# Patient Record
Sex: MALE | Race: WHITE | NOT HISPANIC OR LATINO
[De-identification: names, ages, dates, MRNs, and addresses within clinical notes are randomized per-mention and may not be internally consistent; named-entity substitution may affect disease eponyms.]

---

## 2018-08-09 PROBLEM — Z00.00 ENCOUNTER FOR PREVENTIVE HEALTH EXAMINATION: Status: ACTIVE | Noted: 2018-08-09

## 2018-08-14 ENCOUNTER — APPOINTMENT (OUTPATIENT)
Dept: INTERNAL MEDICINE | Facility: CLINIC | Age: 68
End: 2018-08-14

## 2018-08-14 VITALS
HEART RATE: 53 BPM | DIASTOLIC BLOOD PRESSURE: 70 MMHG | WEIGHT: 206 LBS | TEMPERATURE: 97.7 F | SYSTOLIC BLOOD PRESSURE: 160 MMHG | HEIGHT: 68 IN | OXYGEN SATURATION: 94 % | BODY MASS INDEX: 31.22 KG/M2

## 2018-08-14 DIAGNOSIS — Z78.9 OTHER SPECIFIED HEALTH STATUS: ICD-10-CM

## 2018-08-14 DIAGNOSIS — Z82.5 FAMILY HISTORY OF ASTHMA AND OTHER CHRONIC LOWER RESPIRATORY DISEASES: ICD-10-CM

## 2018-08-14 NOTE — ASSESSMENT
[FreeTextEntry1] : pt with a 1.3 cm nodule seen oncxr. This does not correlate with pulm nodules seen on ct scan of 2016. pt will need repeat ct of chest to better define pulm nodule.

## 2018-08-14 NOTE — HISTORY OF PRESENT ILLNESS
[FreeTextEntry1] : abnormal cxr. [de-identified] : 66 yo nonsmoker. pt has been followed with subcentimeter pulm nodules. the largest is 5mm. this was documented on ct scan in 2016. pt recently went for routine physical and sent for cxr.  this reveals 1.3 cm danya nodular density abutting 9 th posterior rib. this does not correlate with any of pulm nodules seen on prior ct scans. Pt feels fine without sob, cough or wheezing. no weight loss or loss of appetite.

## 2018-08-16 ENCOUNTER — MEDICATION RENEWAL (OUTPATIENT)
Age: 68
End: 2018-08-16

## 2019-03-13 ENCOUNTER — RECORD ABSTRACTING (OUTPATIENT)
Age: 69
End: 2019-03-13

## 2019-03-13 DIAGNOSIS — Z80.0 FAMILY HISTORY OF MALIGNANT NEOPLASM OF DIGESTIVE ORGANS: ICD-10-CM

## 2019-03-13 DIAGNOSIS — Z83.3 FAMILY HISTORY OF DIABETES MELLITUS: ICD-10-CM

## 2019-03-13 DIAGNOSIS — Z87.19 PERSONAL HISTORY OF OTHER DISEASES OF THE DIGESTIVE SYSTEM: ICD-10-CM

## 2019-03-13 DIAGNOSIS — Z82.49 FAMILY HISTORY OF ISCHEMIC HEART DISEASE AND OTHER DISEASES OF THE CIRCULATORY SYSTEM: ICD-10-CM

## 2019-03-13 DIAGNOSIS — Z84.89 FAMILY HISTORY OF OTHER SPECIFIED CONDITIONS: ICD-10-CM

## 2019-03-13 RX ORDER — ASPIRIN 81 MG
81 TABLET, DELAYED RELEASE (ENTERIC COATED) ORAL
Refills: 0 | Status: ACTIVE | COMMUNITY

## 2019-04-29 ENCOUNTER — APPOINTMENT (OUTPATIENT)
Dept: CARDIOLOGY | Facility: CLINIC | Age: 69
End: 2019-04-29
Payer: COMMERCIAL

## 2019-04-29 VITALS
HEART RATE: 57 BPM | WEIGHT: 210 LBS | HEIGHT: 68 IN | BODY MASS INDEX: 31.83 KG/M2 | DIASTOLIC BLOOD PRESSURE: 70 MMHG | SYSTOLIC BLOOD PRESSURE: 140 MMHG

## 2019-04-29 PROCEDURE — 93000 ELECTROCARDIOGRAM COMPLETE: CPT

## 2019-04-29 PROCEDURE — 99213 OFFICE O/P EST LOW 20 MIN: CPT

## 2019-04-29 RX ORDER — ESOMEPRAZOLE SODIUM 40 MG/5ML
40 INJECTION, POWDER, LYOPHILIZED, FOR SOLUTION INTRAVENOUS
Refills: 0 | Status: DISCONTINUED | COMMUNITY
End: 2019-04-29

## 2019-04-29 RX ORDER — ATORVASTATIN CALCIUM 80 MG/1
TABLET, FILM COATED ORAL
Refills: 0 | Status: DISCONTINUED | COMMUNITY
End: 2019-04-29

## 2019-04-29 RX ORDER — OMEPRAZOLE 40 MG/1
40 CAPSULE, DELAYED RELEASE ORAL
Qty: 30 | Refills: 0 | Status: DISCONTINUED | COMMUNITY
Start: 2018-03-18 | End: 2019-04-29

## 2019-04-29 RX ORDER — LISINOPRIL AND HYDROCHLOROTHIAZIDE TABLETS 10; 12.5 MG/1; MG/1
10-12.5 TABLET ORAL
Refills: 0 | Status: DISCONTINUED | COMMUNITY
End: 2019-04-29

## 2019-04-29 NOTE — DISCUSSION/SUMMARY
[FreeTextEntry1] : The patient underwent treadmill stress testing just now. This revealed no evidence of exercise-induced myocardial ischemia. (He was originally scheduled for stress echo but this was denied by his insurance company). I believe it is very important for the patient to increase his program of exercise and obtain weight reduction. I advised a diet limited and carbohydrates.

## 2019-04-29 NOTE — PHYSICAL EXAM
[General Appearance - Well Developed] : well developed [Normal Appearance] : normal appearance [Well Groomed] : well groomed [General Appearance - Well Nourished] : well nourished [No Deformities] : no deformities [General Appearance - In No Acute Distress] : no acute distress [Normal Conjunctiva] : the conjunctiva exhibited no abnormalities [Eyelids - No Xanthelasma] : the eyelids demonstrated no xanthelasmas [Normal Oral Mucosa] : normal oral mucosa [No Oral Pallor] : no oral pallor [No Oral Cyanosis] : no oral cyanosis [Normal Jugular Venous A Waves Present] : normal jugular venous A waves present [Normal Jugular Venous V Waves Present] : normal jugular venous V waves present [No Jugular Venous Neil A Waves] : no jugular venous neil A waves [Respiration, Rhythm And Depth] : normal respiratory rhythm and effort [Exaggerated Use Of Accessory Muscles For Inspiration] : no accessory muscle use [Auscultation Breath Sounds / Voice Sounds] : lungs were clear to auscultation bilaterally [Heart Rate And Rhythm] : heart rate and rhythm were normal [Heart Sounds] : normal S1 and S2 [Murmurs] : no murmurs present [Abdomen Soft] : soft [Abdomen Tenderness] : non-tender [Abdomen Mass (___ Cm)] : no abdominal mass palpated [Abnormal Walk] : normal gait [Gait - Sufficient For Exercise Testing] : the gait was sufficient for exercise testing [Nail Clubbing] : no clubbing of the fingernails [Cyanosis, Localized] : no localized cyanosis [Petechial Hemorrhages (___cm)] : no petechial hemorrhages [Skin Color & Pigmentation] : normal skin color and pigmentation [] : no rash [No Venous Stasis] : no venous stasis [Skin Lesions] : no skin lesions [No Skin Ulcers] : no skin ulcer [No Xanthoma] : no  xanthoma was observed [Oriented To Time, Place, And Person] : oriented to person, place, and time [Affect] : the affect was normal [Mood] : the mood was normal [No Anxiety] : not feeling anxious

## 2019-04-29 NOTE — REASON FOR VISIT
[FreeTextEntry1] : The patient comes to the office today for cardiology followup and stress testing. He has a number of coronary risk factors particularly hyperlipidemia sedentary lifestyle and overweight.The patient does describe an atypical discomfort in the left pectoral region. This typically occurs at night and not during exertion.

## 2019-06-04 ENCOUNTER — RX RENEWAL (OUTPATIENT)
Age: 69
End: 2019-06-04

## 2019-07-29 DIAGNOSIS — R91.1 SOLITARY PULMONARY NODULE: ICD-10-CM

## 2019-07-30 ENCOUNTER — APPOINTMENT (OUTPATIENT)
Dept: CARDIOLOGY | Facility: CLINIC | Age: 69
End: 2019-07-30
Payer: COMMERCIAL

## 2019-07-30 VITALS
DIASTOLIC BLOOD PRESSURE: 60 MMHG | WEIGHT: 210 LBS | BODY MASS INDEX: 31.83 KG/M2 | HEART RATE: 55 BPM | SYSTOLIC BLOOD PRESSURE: 118 MMHG | HEIGHT: 68 IN

## 2019-07-30 PROBLEM — R91.1 PULMONARY NODULE: Status: ACTIVE | Noted: 2018-08-14

## 2019-07-30 PROCEDURE — 99214 OFFICE O/P EST MOD 30 MIN: CPT

## 2019-07-30 PROCEDURE — 93000 ELECTROCARDIOGRAM COMPLETE: CPT

## 2019-07-30 NOTE — HISTORY OF PRESENT ILLNESS
[FreeTextEntry1] : Mr Rincon has been followed here since 2011 for chest pain, cath showed nonocclusive disease.Since last visit he has not been hospitalized. He is working and doesn't find the time to exercise. he gets about 5000 steps/day at work. he describes a left pectoral discomfort at night at rest, close to his clavicle which is burning and sharp and is relieved by "moving around". He had one episode of dyspnea on exertion with an incline. He denies palpitations or syncope.

## 2019-08-05 ENCOUNTER — RX RENEWAL (OUTPATIENT)
Age: 69
End: 2019-08-05

## 2020-01-21 ENCOUNTER — APPOINTMENT (OUTPATIENT)
Dept: CARDIOLOGY | Facility: CLINIC | Age: 70
End: 2020-01-21
Payer: COMMERCIAL

## 2020-01-21 VITALS
HEART RATE: 54 BPM | SYSTOLIC BLOOD PRESSURE: 112 MMHG | DIASTOLIC BLOOD PRESSURE: 60 MMHG | WEIGHT: 214 LBS | BODY MASS INDEX: 32.43 KG/M2 | HEIGHT: 68 IN

## 2020-01-21 PROCEDURE — 93015 CV STRESS TEST SUPVJ I&R: CPT

## 2020-01-21 PROCEDURE — 99214 OFFICE O/P EST MOD 30 MIN: CPT | Mod: 25

## 2020-01-21 RX ORDER — NITROGLYCERIN 0.4 MG/1
0.4 TABLET SUBLINGUAL
Qty: 30 | Refills: 3 | Status: ACTIVE | COMMUNITY
Start: 2020-01-21 | End: 1900-01-01

## 2020-01-21 RX ORDER — FAMOTIDINE 40 MG/1
40 TABLET, FILM COATED ORAL DAILY
Refills: 0 | Status: ACTIVE | COMMUNITY

## 2020-01-21 RX ORDER — RANITIDINE 300 MG/1
300 TABLET ORAL
Qty: 30 | Refills: 0 | Status: DISCONTINUED | COMMUNITY
Start: 2018-03-30 | End: 2020-01-21

## 2020-01-21 NOTE — HISTORY OF PRESENT ILLNESS
[FreeTextEntry1] : Mr Rincon has been followed here since 2011 for chest pain, cath showed nonocclusive disease\par .Since last visit he has not been hospitalized. \par He is working and doesn't find the time to exercise. he gets about 5000 steps/day at work.\par he continues to have upper left pectoral pain, dull at rest, lasting an hour relieved  spontaneously, daily He is feeling it is more lately which he attributes to stress. He denies dyspnea, palpitations or syncope.\par

## 2020-01-21 NOTE — REVIEW OF SYSTEMS
[Eyeglasses] : currently wearing eyeglasses [Negative] : Heme/Lymph [FreeTextEntry1] : polyuria, cough, insomnia

## 2020-05-31 ENCOUNTER — RX RENEWAL (OUTPATIENT)
Age: 70
End: 2020-05-31

## 2020-06-29 ENCOUNTER — RESULT CHARGE (OUTPATIENT)
Age: 70
End: 2020-06-29

## 2020-06-30 ENCOUNTER — APPOINTMENT (OUTPATIENT)
Dept: CARDIOLOGY | Facility: CLINIC | Age: 70
End: 2020-06-30
Payer: COMMERCIAL

## 2020-06-30 VITALS
WEIGHT: 212 LBS | DIASTOLIC BLOOD PRESSURE: 80 MMHG | HEIGHT: 68 IN | SYSTOLIC BLOOD PRESSURE: 140 MMHG | BODY MASS INDEX: 32.13 KG/M2 | HEART RATE: 58 BPM

## 2020-06-30 PROCEDURE — 99214 OFFICE O/P EST MOD 30 MIN: CPT

## 2020-06-30 PROCEDURE — 93000 ELECTROCARDIOGRAM COMPLETE: CPT

## 2020-06-30 RX ORDER — METFORMIN HYDROCHLORIDE 1000 MG/1
1000 TABLET, COATED ORAL
Refills: 0 | Status: ACTIVE | COMMUNITY

## 2020-06-30 NOTE — REASON FOR VISIT
[Coronary Artery Disease] : coronary artery disease [FreeTextEntry2] : 3 month [Follow-Up - Clinic] : a clinic follow-up of

## 2020-06-30 NOTE — HISTORY OF PRESENT ILLNESS
[FreeTextEntry1] : Mr Rincon has been followed here since 2011 for chest pain, cath showed nonocclusive disease\par .Since last visit he has not been hospitalized. \par he continues to have the upper left pectoral discomfort at rest, sporadic, nonexertional. He denies chest pain,  palpitations or syncope.\par He is sedentary.\par \par

## 2020-09-07 ENCOUNTER — NON-APPOINTMENT (OUTPATIENT)
Age: 70
End: 2020-09-07

## 2020-10-12 ENCOUNTER — APPOINTMENT (OUTPATIENT)
Dept: GASTROENTEROLOGY | Facility: CLINIC | Age: 70
End: 2020-10-12
Payer: COMMERCIAL

## 2020-10-12 VITALS
HEART RATE: 54 BPM | WEIGHT: 210 LBS | SYSTOLIC BLOOD PRESSURE: 114 MMHG | HEIGHT: 68 IN | BODY MASS INDEX: 31.83 KG/M2 | DIASTOLIC BLOOD PRESSURE: 60 MMHG | TEMPERATURE: 97.1 F

## 2020-10-12 DIAGNOSIS — Z12.11 ENCOUNTER FOR SCREENING FOR MALIGNANT NEOPLASM OF COLON: ICD-10-CM

## 2020-10-12 DIAGNOSIS — Z80.0 ENCOUNTER FOR SCREENING FOR MALIGNANT NEOPLASM OF COLON: ICD-10-CM

## 2020-10-12 PROCEDURE — 99205 OFFICE O/P NEW HI 60 MIN: CPT

## 2020-10-12 NOTE — ASSESSMENT
[FreeTextEntry1] : 1. History of colon polyp / family h/o colon cancer / BRBPR: Colonoscopy planned for screening / follow up and to document hemorrhoids as source of occasional BRBPR\par \par 2. Abdominal pain:  CT scan if colonoscopy unremarkable\par \par 3. GERD:  Continue dietary  / lifestyle modification along with PPI

## 2020-10-12 NOTE — REASON FOR VISIT
[Consultation] : a consultation visit [FreeTextEntry1] : Family h/o colon cancer / colon cancer screening

## 2020-10-12 NOTE — CONSULT LETTER
[Dear  ___] : Dear  [unfilled], [Consult Letter:] : I had the pleasure of evaluating your patient, [unfilled]. [Please see my note below.] : Please see my note below. [Consult Closing:] : Thank you very much for allowing me to participate in the care of this patient.  If you have any questions, please do not hesitate to contact me. [Sincerely,] : Sincerely, [FreeTextEntry3] : Eddie Fuentes MD\par tel: [phone]\par fax: [phone]\par

## 2020-10-12 NOTE — HISTORY OF PRESENT ILLNESS
[de-identified] : ANDREA GROVE  is being evaluated at the request of Dr. Latham for an opinion re:  colon cancer screening. Additionally c/o a 6 month h/o of mild i / intermittent RLQ pain ( no  clear precipitating  / alleviating factors ) . Noticeable mostly while lying down at nignt. Denies nausea, vomiting, fever, chills, diarrhea, constipation, melena, hematemesis. Admits to occasional BRBPR which he attributes to hemorrhoids.  His reflux is controlled with diet and medication. \par Colonoscopy in 9/2015 notable for diverticulosis , hemorrhoids, 1 adenoma, 1 sessile serrated polyp and 1 hyperplastic polyp. EGD 11/2015 for GERD notable  for fundal polyps, H. pylori associated gastritis ( treated..with documented eradication) \par Colonoscopy in 2011 and 2004 ( family h/o colon cancer in brother)..polyps / diverticulosis / hemorrhoids.  Colonoscopy in 2006 was unremarkable\par \par

## 2020-10-30 ENCOUNTER — RESULT REVIEW (OUTPATIENT)
Age: 70
End: 2020-10-30

## 2020-11-01 ENCOUNTER — RESULT REVIEW (OUTPATIENT)
Age: 70
End: 2020-11-01

## 2020-11-02 ENCOUNTER — APPOINTMENT (OUTPATIENT)
Dept: GASTROENTEROLOGY | Facility: HOSPITAL | Age: 70
End: 2020-11-02

## 2020-11-04 ENCOUNTER — TRANSCRIPTION ENCOUNTER (OUTPATIENT)
Age: 70
End: 2020-11-04

## 2020-11-05 ENCOUNTER — NON-APPOINTMENT (OUTPATIENT)
Age: 70
End: 2020-11-05

## 2020-12-23 PROBLEM — Z12.11 ENCOUNTER FOR COLONOSCOPY IN PATIENT WITH FAMILY HISTORY OF COLON CANCER: Status: RESOLVED | Noted: 2020-10-12 | Resolved: 2020-12-23

## 2021-02-04 ENCOUNTER — APPOINTMENT (OUTPATIENT)
Dept: CARDIOLOGY | Facility: CLINIC | Age: 71
End: 2021-02-04
Payer: COMMERCIAL

## 2021-02-04 VITALS
BODY MASS INDEX: 31.07 KG/M2 | HEIGHT: 68 IN | TEMPERATURE: 97.7 F | DIASTOLIC BLOOD PRESSURE: 65 MMHG | WEIGHT: 205 LBS | HEART RATE: 54 BPM | SYSTOLIC BLOOD PRESSURE: 110 MMHG

## 2021-02-04 DIAGNOSIS — K64.9 UNSPECIFIED HEMORRHOIDS: ICD-10-CM

## 2021-02-04 PROCEDURE — 99072 ADDL SUPL MATRL&STAF TM PHE: CPT

## 2021-02-04 PROCEDURE — 99214 OFFICE O/P EST MOD 30 MIN: CPT

## 2021-02-04 PROCEDURE — 93000 ELECTROCARDIOGRAM COMPLETE: CPT

## 2021-02-04 NOTE — HISTORY OF PRESENT ILLNESS
[FreeTextEntry1] : Mr Rincon has been followed here since 2011 for chest pain, cath showed nonocclusive disease\par .Since last visit he has not been hospitalized. \par During a dental cleaning a few weeks ago he felt mild left pectoral pain, dull, lasting until the cleaning was done, \par he continues to have the upper left pectoral discomfort at rest, at night sporadic, nonexertional positional. He denies chest pain,  palpitations or syncope.\par He is sedentary.\par He recently shoveled without difficulty.\par \par

## 2021-03-08 ENCOUNTER — RESULT REVIEW (OUTPATIENT)
Age: 71
End: 2021-03-08

## 2021-04-14 ENCOUNTER — RX RENEWAL (OUTPATIENT)
Age: 71
End: 2021-04-14

## 2021-04-29 ENCOUNTER — APPOINTMENT (OUTPATIENT)
Dept: NEUROLOGY | Facility: CLINIC | Age: 71
End: 2021-04-29
Payer: COMMERCIAL

## 2021-04-29 ENCOUNTER — LABORATORY RESULT (OUTPATIENT)
Age: 71
End: 2021-04-29

## 2021-04-29 VITALS
SYSTOLIC BLOOD PRESSURE: 132 MMHG | HEIGHT: 68 IN | BODY MASS INDEX: 30.62 KG/M2 | HEART RATE: 53 BPM | DIASTOLIC BLOOD PRESSURE: 77 MMHG | WEIGHT: 202 LBS | TEMPERATURE: 97.2 F

## 2021-04-29 PROCEDURE — 99072 ADDL SUPL MATRL&STAF TM PHE: CPT

## 2021-04-29 PROCEDURE — 99204 OFFICE O/P NEW MOD 45 MIN: CPT

## 2021-04-29 RX ORDER — LOSARTAN POTASSIUM 50 MG/1
50 TABLET, FILM COATED ORAL DAILY
Refills: 0 | Status: DISCONTINUED | COMMUNITY
Start: 2019-05-13 | End: 2021-04-29

## 2021-04-29 NOTE — PHYSICAL EXAM
[FreeTextEntry1] : Physical examination \par General: No acute distress, Awake, Alert.   \par  \par \par Mental status \par Awake, alert, gives detailed history.\par \par Cranial Nerves \par II: VFF  \par III, IV, VI: PERRL, EOMI.   \par V: Facial sensation is normal B/L.   \par VII: Facial strength is normal B/L. \par \par \par VIII: Gross hearing is intact.   \par  \par \par IX, X: Palate is midline and elevates symmetrically.   \par XI: Trapezius normal strength.   \par XII: Tongue midline without atrophy or fasciculations. \par \par Motor exam  \par Muscle tone - no evidence of rigidity or resistance in all 4 extremities.  \par No atrophy or fasciculations \par Muscle Strength: arms and legs, proximal and distal flexors and extensors are normal \par \par No UE drift.\par \par Reflexes \par All present, normal, and symmetrical.   \par   \par Plantars right: mute.   \par Plantars left: mute.   \par \par Absent ankle jerks.   \par \par Coordination \par Finger to nose: Normal.  \par Heel to shin: Normal.    \par \par Sensory \par Intact proprioception. \par Decreased PP to hands and feet in stocking glove distribution.\par Decreased cold feet. \par Decreased vibration to great toes B.\par  \par \par Gait \par Normal including heels, toes, and tandem gait.  \par

## 2021-04-29 NOTE — HISTORY OF PRESENT ILLNESS
[FreeTextEntry1] : Pacheco Rincon is a 70 year old man with a history of CAD, type 2 DM x 3 years, and sleep apnea presenting for a consultation for pain in his feet and toes - worst at night. \par \par He endorses he has had burning pain in both feet for over one year primarily in the sole and toes. His pain fluctuates in intensity and his feet also feel cold at times.  Mr. Rincon denies any weakness. He saw a podiatrist and was told he had diabetic neuropathy. He is currently taking Gabapentin 300mg at night with partial relief. The pain used to wake him up more frequently at night but still disturbs his sleep. He endorses an upset stomach for 1-2 weeks and is not sure if it is medication related. He had increased the medication one week prior to his stomach discomfort and takes Omeprazole and Famotidine. He denies any balance changes, bowel or bladder difficulties, falls, or injuries. \par \par He had arterial dopplers done and was told that it was not consistent with peripheral artery disease. \par \par The remaining neurological review of systems is negative. \par

## 2021-04-29 NOTE — ASSESSMENT
Please call patient's wife, Varsha, to clarify dosage of escitalopram (LEXAPRO) 20 MG tablet    She says that yesterdays AVS shows a different dosage than what she thinks it is supposed to be   [FreeTextEntry1] : Pacheco Rincon is a 70 year old man with peripheral neuropathy.\par \par Clinically consistent with a painful small fiber peripheral neuropathy most likely due to diabetes mellitus\par Serological workup to look for any other identifiable causes.  \par EMG/NCS to assess for any large fiber involvement. \par Titrate Gabapentin upward to 600mg nightly (see chart note)\par Follow up in 6 weeks and then one week after EMG.\par \par I discussed in detail with the patient the diagnosis, prognosis, treatment plan and answered all of his questions.\par \par \par

## 2021-04-29 NOTE — CONSULT LETTER
[Dear  ___] : Dear  [unfilled], [FreeTextEntry1] : I had the pleasure of evaluating your patient, ANDREA GROVE. Please see the assessment section below for a summary of my diagnostic impression and plan.\par \par Thank you very much for allowing me to participate in the care of this patient. If you have any questions, please do not hesitate to contact me. \par \par Sincerely,\par \par Janel Eduardo MD\par Milana Pina N.P.\par

## 2021-04-29 NOTE — REASON FOR VISIT
[Consultation] : a consultation visit [FreeTextEntry1] : Pain in the feet and toes especially at night

## 2021-05-17 LAB
B BURGDOR IGG+IGM SER QL IB: NORMAL
TSH SERPL-ACNC: 0.42 UIU/ML
VIT B12 SERPL-MCNC: 699 PG/ML

## 2021-05-19 LAB
ALBUMIN MFR SERPL ELPH: 63.6 %
ALBUMIN SERPL-MCNC: 4.3 G/DL
ALBUMIN/GLOB SERPL: 1.8 RATIO
ALPHA1 GLOB MFR SERPL ELPH: 3.8 %
ALPHA1 GLOB SERPL ELPH-MCNC: 0.3 G/DL
ALPHA2 GLOB MFR SERPL ELPH: 9.7 %
ALPHA2 GLOB SERPL ELPH-MCNC: 0.6 G/DL
B-GLOBULIN MFR SERPL ELPH: 11.2 %
B-GLOBULIN SERPL ELPH-MCNC: 0.8 G/DL
GAMMA GLOB FLD ELPH-MCNC: 0.8 G/DL
GAMMA GLOB MFR SERPL ELPH: 11.7 %
INTERPRETATION SERPL IEP-IMP: NORMAL
M PROTEIN SPEC IFE-MCNC: NORMAL
PROT SERPL-MCNC: 6.7 G/DL
PROT SERPL-MCNC: 6.7 G/DL

## 2021-05-20 LAB
ALBUPE: 14.6 %
ALPHA1UPE: 40.4 %
ALPHA2UPE: 20.8 %
BETAUPE: 16.3 %
CREAT 24H UR-MCNC: NORMAL G/24 H
CREATININE UR (MAYO): 110 MG/DL
GAMMAUPE: 7.9 %
IGA 24H UR QL IFE: NORMAL
KAPPA LC 24H UR QL: NORMAL
PROT PATTERN 24H UR ELPH-IMP: NORMAL
PROT UR-MCNC: 9 MG/DL
PROT UR-MCNC: 9 MG/DL
SPECIMEN VOL 24H UR: NORMAL ML

## 2021-05-21 LAB — VIT B6 SERPL-MCNC: 74.9 UG/L

## 2021-05-24 LAB — VIT B1 SERPL-MCNC: 164.6 NMOL/L

## 2021-06-03 ENCOUNTER — RX RENEWAL (OUTPATIENT)
Age: 71
End: 2021-06-03

## 2021-06-10 ENCOUNTER — APPOINTMENT (OUTPATIENT)
Dept: NEUROLOGY | Facility: CLINIC | Age: 71
End: 2021-06-10
Payer: COMMERCIAL

## 2021-06-10 VITALS
WEIGHT: 201 LBS | BODY MASS INDEX: 30.46 KG/M2 | TEMPERATURE: 97.5 F | HEART RATE: 51 BPM | SYSTOLIC BLOOD PRESSURE: 118 MMHG | DIASTOLIC BLOOD PRESSURE: 70 MMHG | HEIGHT: 68 IN

## 2021-06-10 PROCEDURE — 99072 ADDL SUPL MATRL&STAF TM PHE: CPT

## 2021-06-10 PROCEDURE — 99214 OFFICE O/P EST MOD 30 MIN: CPT

## 2021-06-13 NOTE — ASSESSMENT
[FreeTextEntry1] : Pacheco Rincon is a 70 year old man with peripheral neuropathy.\par \par Clinically consistent with a painful small fiber peripheral neuropathy most likely due to diabetes mellitus\par Serological workup to look for any other identifiable causes.  \par EMG/NCS to assess for any large fiber involvement scheduled for 7/7. \par Avoid B6 supplements- pt does not currently take any.\par Titrate Gabapentin upward to 700mg nightly (see chart note) and monitor for side effects and improvement.\par Follow up one week after EMG.\par \par I discussed in detail with the patient the diagnosis, prognosis, treatment plan and answered all of his questions.\par \par \par

## 2021-06-13 NOTE — HISTORY OF PRESENT ILLNESS
[FreeTextEntry1] : Pacheco Rincon is a 70 year old man with a history of CAD, T2DM, and sleep apnea presenting for a follow up appointment for pain in his feet and toes-worse at night.\par \par He endorses he is currently on Gabapentin 500mg nightly. He is unsure if it makes him tired or not. He still has burning pain in the morning. He reports sensitivity with the sheets on his feet at night and would like to continue to Gabapentin. Denies any weakness.\par \par The remaining neurological review of systems is negative. \par \par 4/29/21\par Pacheco Rincon is a 70 year old man with a history of CAD, type 2 DM x 3 years, and sleep apnea presenting for a consultation for pain in his feet and toes - worst at night. \par \par He endorses he has had burning pain in both feet for over one year primarily in the sole and toes. His pain fluctuates in intensity and his feet also feel cold at times.  Mr. Rincon denies any weakness. He saw a podiatrist and was told he had diabetic neuropathy. He is currently taking Gabapentin 300mg at night with partial relief. The pain used to wake him up more frequently at night but still disturbs his sleep. He endorses an upset stomach for 1-2 weeks and is not sure if it is medication related. He had increased the medication one week prior to his stomach discomfort and takes Omeprazole and Famotidine. He denies any balance changes, bowel or bladder difficulties, falls, or injuries. \par \par He had arterial dopplers done and was told that it was not consistent with peripheral artery disease. \par \par The remaining neurological review of systems is negative. \par

## 2021-06-13 NOTE — END OF VISIT
[Time Spent: ___ minutes] : I have spent [unfilled] minutes of time on the encounter. [FreeTextEntry3] :   I agree with the findings and plan as documented in the Nurse Practitioner’s note, unless noted below.\par Attesting Faculty: See Attending Electronic Signature Below\par \par

## 2021-07-07 ENCOUNTER — APPOINTMENT (OUTPATIENT)
Dept: NEUROLOGY | Facility: CLINIC | Age: 71
End: 2021-07-07
Payer: COMMERCIAL

## 2021-07-07 PROCEDURE — 99072 ADDL SUPL MATRL&STAF TM PHE: CPT

## 2021-07-07 PROCEDURE — 95911 NRV CNDJ TEST 9-10 STUDIES: CPT

## 2021-07-07 PROCEDURE — 95886 MUSC TEST DONE W/N TEST COMP: CPT

## 2021-07-15 ENCOUNTER — APPOINTMENT (OUTPATIENT)
Dept: NEUROLOGY | Facility: CLINIC | Age: 71
End: 2021-07-15
Payer: COMMERCIAL

## 2021-07-15 VITALS
HEART RATE: 52 BPM | TEMPERATURE: 96.9 F | HEIGHT: 68 IN | SYSTOLIC BLOOD PRESSURE: 112 MMHG | WEIGHT: 202 LBS | BODY MASS INDEX: 30.62 KG/M2 | DIASTOLIC BLOOD PRESSURE: 73 MMHG

## 2021-07-15 PROCEDURE — 99214 OFFICE O/P EST MOD 30 MIN: CPT

## 2021-07-15 PROCEDURE — 99072 ADDL SUPL MATRL&STAF TM PHE: CPT

## 2021-07-16 RX ORDER — OMEPRAZOLE 40 MG/1
40 CAPSULE, DELAYED RELEASE ORAL
Refills: 0 | Status: DISCONTINUED | COMMUNITY
End: 2021-07-16

## 2021-07-16 RX ORDER — OMEPRAZOLE 20 MG/1
20 CAPSULE, DELAYED RELEASE ORAL
Qty: 90 | Refills: 0 | Status: ACTIVE | COMMUNITY
Start: 2021-07-12

## 2021-07-21 NOTE — HISTORY OF PRESENT ILLNESS
[FreeTextEntry1] : Pacheco Rincon is a 70 year old man with a history of CAD, T2DM, and sleep apnea presenting for a follow up appointment for pain in his feet and toes and EMG results.\par \par He endorses he is currently taking Gabapentin 600mg nightly one hour before bedtime. He did not want to increase the dose. The medication provides relief of the pain at night. He continues to have minimal amount of pain during the day, he did not add the Gabapentin 100mg dose yet during the daytime or morning. He denies any weakness.\par \par The remaining neurological review of systems is negative. \par \par 6/10/21\par Pacheco Rincon is a 70 year old man with a history of CAD, T2DM, and sleep apnea presenting for a follow up appointment for pain in his feet and toes-worse at night.\par \par He endorses he is currently on Gabapentin 500mg nightly. He is unsure if it makes him tired or not. He still has burning pain in the morning. He reports sensitivity with the sheets on his feet at night and would like to continue to Gabapentin. Denies any weakness.\par \par The remaining neurological review of systems is negative. \par \par 4/29/21\par Pacheco Rincon is a 70 year old man with a history of CAD, type 2 DM x 3 years, and sleep apnea presenting for a consultation for pain in his feet and toes - worst at night. \par \par He endorses he has had burning pain in both feet for over one year primarily in the sole and toes. His pain fluctuates in intensity and his feet also feel cold at times.  Mr. Rincon denies any weakness. He saw a podiatrist and was told he had diabetic neuropathy. He is currently taking Gabapentin 300mg at night with partial relief. The pain used to wake him up more frequently at night but still disturbs his sleep. He endorses an upset stomach for 1-2 weeks and is not sure if it is medication related. He had increased the medication one week prior to his stomach discomfort and takes Omeprazole and Famotidine. He denies any balance changes, bowel or bladder difficulties, falls, or injuries. \par \par He had arterial dopplers done and was told that it was not consistent with peripheral artery disease. \par \par The remaining neurological review of systems is negative. \par

## 2021-07-21 NOTE — ASSESSMENT
[FreeTextEntry1] : Pacheco Rincon is a 70 year old man with peripheral neuropathy.\par Clinically consistent with a painful small fiber peripheral neuropathy most likely due to diabetes mellitus\par   \par EMG/NCS reviewed with patient- No large fiber involvement.\par Avoid B6 supplements- pt does not currently take any.\par Continue Gabapentin 600mg nightly. Pt given script for additional dose of Gabapentin 100mg in AM if he wants to add AM dose for daytime pain which is mild now. He will consider it. \par \par Follow up in 3 months. \par \par Case discussed with Dr. Eduardo. \par \par I discussed in detail with the patient the diagnosis, prognosis, treatment plan and answered all of his questions.\par \par \par

## 2021-07-21 NOTE — PHYSICAL EXAM
[FreeTextEntry1] : Physical examination \par General: No acute distress, Awake, Alert.   \par  \par \par Mental status \par Awake, alert, gives detailed history.\par \par Cranial Nerves \par II: VFF  \par III, IV, VI: PERRL, EOMI.   \par V: Facial sensation is normal B/L.   \par VII: Facial strength is normal B/L. \par \par \par VIII: Gross hearing is intact.   \par  \par \par IX, X: Palate is midline and elevates symmetrically.   \par XI: Trapezius normal strength.   \par XII: Tongue midline without atrophy or fasciculations. \par \par Motor exam  \par Muscle tone - no evidence of rigidity or resistance in all 4 extremities.  \par No atrophy or fasciculations \par Muscle Strength: arms and legs, proximal and distal flexors and extensors are normal \par \par No UE drift.\par \par Reflexes \par All present, normal, and symmetrical.   \par   \par Plantars right: mute.   \par Plantars left: mute.   \par \par Absent ankle jerks.   \par \par Coordination \par Finger to nose: Normal.  \par Heel to shin: Normal.    \par \par Sensory \par Intact proprioception. \par Decreased PP to hands and feet in stocking glove distribution.\par Decreased cold feet. \par Decreased vibration to great toes B.\par  \par mild positive Romberg.\par \par Gait \par Normal including heels, toes, and tandem gait.  \par

## 2021-07-21 NOTE — DATA REVIEWED
[de-identified] : 7/7/21 EMG legs- There is no electrophysiologic evidence of a LARGE FIBER polyneuropathy involving the legs. There is no electrophysiologic evidence of a lumbosacral radiculopathy involving the motor axons, bilaterally. The H reflex is absent, bilaterally. This may be normal for age or may be due to a chronic S1 radiculopathy, bilaterally. \par 3/16/21 TSH .184\par 3/15/21 HgA1C 6.6

## 2021-08-05 ENCOUNTER — APPOINTMENT (OUTPATIENT)
Dept: CARDIOLOGY | Facility: CLINIC | Age: 71
End: 2021-08-05

## 2021-09-30 ENCOUNTER — APPOINTMENT (OUTPATIENT)
Dept: CARDIOLOGY | Facility: CLINIC | Age: 71
End: 2021-09-30
Payer: COMMERCIAL

## 2021-09-30 VITALS
HEIGHT: 68 IN | SYSTOLIC BLOOD PRESSURE: 120 MMHG | TEMPERATURE: 68.14 F | DIASTOLIC BLOOD PRESSURE: 72 MMHG | WEIGHT: 202 LBS | BODY MASS INDEX: 30.62 KG/M2

## 2021-09-30 PROCEDURE — 90471 IMMUNIZATION ADMIN: CPT

## 2021-09-30 PROCEDURE — 93000 ELECTROCARDIOGRAM COMPLETE: CPT

## 2021-09-30 PROCEDURE — 99214 OFFICE O/P EST MOD 30 MIN: CPT | Mod: 25

## 2021-09-30 PROCEDURE — 90662 IIV NO PRSV INCREASED AG IM: CPT

## 2021-09-30 NOTE — HISTORY OF PRESENT ILLNESS
[FreeTextEntry1] : Mr Rincon has been followed here since 2011 for chest pain, cath showed nonocclusive disease\par .Since last visit he has not been hospitalized. \par \par He has occasional chest discomfort, during physical work a few days ago, left sided, tightness, lasted minutes, relieved by rest. He denies , dyspnea, palpitations or syncope.\par \par He is sedentary.\par \par \par

## 2021-10-14 ENCOUNTER — NON-APPOINTMENT (OUTPATIENT)
Age: 71
End: 2021-10-14

## 2021-10-14 ENCOUNTER — APPOINTMENT (OUTPATIENT)
Dept: NEUROLOGY | Facility: CLINIC | Age: 71
End: 2021-10-14
Payer: COMMERCIAL

## 2021-10-14 VITALS
BODY MASS INDEX: 30.62 KG/M2 | WEIGHT: 202 LBS | HEIGHT: 68 IN | TEMPERATURE: 97.2 F | HEART RATE: 47 BPM | SYSTOLIC BLOOD PRESSURE: 126 MMHG | DIASTOLIC BLOOD PRESSURE: 74 MMHG

## 2021-10-14 VITALS — HEART RATE: 50 BPM

## 2021-10-14 PROCEDURE — 99214 OFFICE O/P EST MOD 30 MIN: CPT

## 2021-10-15 NOTE — DATA REVIEWED
[de-identified] : 7/7/21 EMG legs- There is no electrophysiologic evidence of a LARGE FIBER polyneuropathy involving the legs. There is no electrophysiologic evidence of a lumbosacral radiculopathy involving the motor axons, bilaterally. The H reflex is absent, bilaterally. This may be normal for age or may be due to a chronic S1 radiculopathy, bilaterally. \par 3/16/21 TSH .184\par 3/15/21 HgA1C 6.6

## 2021-10-15 NOTE — HISTORY OF PRESENT ILLNESS
[FreeTextEntry1] : Pacheco Rincon is a 70 year old man with a history of CAD, T2DM, and sleep apnea presenting for a follow up appointment for neuropathic pain.\par \par He is currently taking Gabapentin 600mg at 8pm and goes to bed at 10:30pm. Mr. Rincon reports the burning pain in his feet starts in the evening at 8pm. He endorses his pain is acceptable during the day except at times when he wants to walk during lunchtime. He has difficulty sleeping and burning pain during the night. He did not take the additional dose of Gabapentin 100mg due to fear of drowsiness for when he goes to work. Denies weakness or progression of symptoms.\par \par Mr. Rincon endorses he had a general lightheadedness for a few minutes last week and plans on following up with his cardiologist because of the adjustment in his cardiac medications. Denies falls or balance difficulties. Denies the sensation of the room spinning, facial droop, changes in speech, or weakness on one side. Denies any dizziness currently. \par \par He has a CPAP machine for obstructive sleep apnea and wakes up with a morning frontal headache which improves during the day. He has not seen his sleep specialist in a long time.\par \par The remaining neurological review of systems is negative.\par \par 7/15/21 \par Pacheco Rincon is a 70 year old man with a history of CAD, T2DM, and sleep apnea presenting for a follow up appointment for pain in his feet and toes and EMG results.\par \par He endorses he is currently taking Gabapentin 600mg nightly one hour before bedtime. He did not want to increase the dose. The medication provides relief of the pain at night. He continues to have minimal amount of pain during the day, he did not add the Gabapentin 100mg dose yet during the daytime or morning. He denies any weakness.\par \par The remaining neurological review of systems is negative. \par \par 6/10/21\par Pacheco Rincon is a 70 year old man with a history of CAD, T2DM, and sleep apnea presenting for a follow up appointment for pain in his feet and toes-worse at night.\par \par He endorses he is currently on Gabapentin 500mg nightly. He is unsure if it makes him tired or not. He still has burning pain in the morning. He reports sensitivity with the sheets on his feet at night and would like to continue to Gabapentin. Denies any weakness.\par \par The remaining neurological review of systems is negative. \par \par 4/29/21\par Pacheco Rincon is a 70 year old man with a history of CAD, type 2 DM x 3 years, and sleep apnea presenting for a consultation for pain in his feet and toes - worst at night. \par \par He endorses he has had burning pain in both feet for over one year primarily in the sole and toes. His pain fluctuates in intensity and his feet also feel cold at times.  Mr. Rincon denies any weakness. He saw a podiatrist and was told he had diabetic neuropathy. He is currently taking Gabapentin 300mg at night with partial relief. The pain used to wake him up more frequently at night but still disturbs his sleep. He endorses an upset stomach for 1-2 weeks and is not sure if it is medication related. He had increased the medication one week prior to his stomach discomfort and takes Omeprazole and Famotidine. He denies any balance changes, bowel or bladder difficulties, falls, or injuries. \par \par He had arterial dopplers done and was told that it was not consistent with peripheral artery disease. \par \par The remaining neurological review of systems is negative. \par

## 2021-10-15 NOTE — ASSESSMENT
[FreeTextEntry1] : Pacheco Rincon is a 71 year old man with peripheral neuropathy.\par Clinically consistent with a painful small fiber peripheral neuropathy most likely due to diabetes mellitus\par   \par EMG/NCS reviewed with patient previously- No large fiber involvement.\par Avoid B6 supplements- pt does not currently take any.\par Continue Gabapentin 600mg nightly will increase by 100mg weekly up to 900mg in the evening (see chart note). Pt encouraged to begin trial of increasing dose when he does not have to go to work to monitor for any sedation.  \par \par Obstructive sleep apnea with recent morning headaches- follow up with sleep specialist.\par \par Asymptomatic bradycardia and episode of dizziness- continue follow up with cardiology. \par \par Follow up in 3 months. \par \par Case discussed with Dr. Eduardo. \par \par I discussed in detail with the patient the diagnosis, prognosis, treatment plan and answered all of his questions.\par \par \par

## 2022-01-12 ENCOUNTER — APPOINTMENT (OUTPATIENT)
Dept: NEUROLOGY | Facility: CLINIC | Age: 72
End: 2022-01-12
Payer: COMMERCIAL

## 2022-01-12 PROCEDURE — 99212 OFFICE O/P EST SF 10 MIN: CPT | Mod: 95

## 2022-01-12 RX ORDER — PSYLLIUM HUSK 0.4 G
CAPSULE ORAL
Refills: 0 | Status: ACTIVE | COMMUNITY

## 2022-01-12 RX ORDER — CHLORHEXIDINE GLUCONATE 4 %
LIQUID (ML) TOPICAL
Refills: 0 | Status: ACTIVE | COMMUNITY

## 2022-01-12 RX ORDER — ASCORBIC ACID 500 MG
TABLET ORAL
Refills: 0 | Status: ACTIVE | COMMUNITY

## 2022-01-12 NOTE — HISTORY OF PRESENT ILLNESS
[FreeTextEntry1] : Pacheco Rincon is a 71 year old man with a history of CAD, T2DM, and sleep apnea presenting for a follow up appointment for neuropathic pain.\par \par He is currently taking Gabapentin 800mg at 8pm and it lasts partially throughout the night but he will have burning at times. He has not tried Lidocaine cream in the past. He did not try any higher dose of Gabapentin. He denies weakness or balance difficulties or worsening dizziness. Mr. Rincon is awaiting his CPAP machine for ROSA. \par Does not want to increase Gabapentin dose much further due to possible drowsiness side effect. \par \par The remaining neurological review of systems is negative.\par \par 10/14/21\par Pacheco Rincon is a 70 year old man with a history of CAD, T2DM, and sleep apnea presenting for a follow up appointment for neuropathic pain.\par \par He is currently taking Gabapentin 600mg at 8pm and goes to bed at 10:30pm. Mr. Rincon reports the burning pain in his feet starts in the evening at 8pm. He endorses his pain is acceptable during the day except at times when he wants to walk during lunchtime. He has difficulty sleeping and burning pain during the night. He did not take the additional dose of Gabapentin 100mg due to fear of drowsiness for when he goes to work. Denies weakness or progression of symptoms.\par \par Mr. Rincon endorses he had a general lightheadedness for a few minutes last week and plans on following up with his cardiologist because of the adjustment in his cardiac medications. Denies falls or balance difficulties. Denies the sensation of the room spinning, facial droop, changes in speech, or weakness on one side. Denies any dizziness currently. \par \par He has a CPAP machine for obstructive sleep apnea and wakes up with a morning frontal headache which improves during the day. He has not seen his sleep specialist in a long time.\par \par The remaining neurological review of systems is negative.\par \par 7/15/21 \par Pacheco Rincon is a 70 year old man with a history of CAD, T2DM, and sleep apnea presenting for a follow up appointment for pain in his feet and toes and EMG results.\par \par He endorses he is currently taking Gabapentin 600mg nightly one hour before bedtime. He did not want to increase the dose. The medication provides relief of the pain at night. He continues to have minimal amount of pain during the day, he did not add the Gabapentin 100mg dose yet during the daytime or morning. He denies any weakness.\par \par The remaining neurological review of systems is negative. \par \par 6/10/21\par Pacheco Rincon is a 70 year old man with a history of CAD, T2DM, and sleep apnea presenting for a follow up appointment for pain in his feet and toes-worse at night.\par \par He endorses he is currently on Gabapentin 500mg nightly. He is unsure if it makes him tired or not. He still has burning pain in the morning. He reports sensitivity with the sheets on his feet at night and would like to continue to Gabapentin. Denies any weakness.\par \par The remaining neurological review of systems is negative. \par \par 4/29/21\par Pacheco Rincon is a 70 year old man with a history of CAD, type 2 DM x 3 years, and sleep apnea presenting for a consultation for pain in his feet and toes - worst at night. \par \par He endorses he has had burning pain in both feet for over one year primarily in the sole and toes. His pain fluctuates in intensity and his feet also feel cold at times.  Mr. Rincon denies any weakness. He saw a podiatrist and was told he had diabetic neuropathy. He is currently taking Gabapentin 300mg at night with partial relief. The pain used to wake him up more frequently at night but still disturbs his sleep. He endorses an upset stomach for 1-2 weeks and is not sure if it is medication related. He had increased the medication one week prior to his stomach discomfort and takes Omeprazole and Famotidine. He denies any balance changes, bowel or bladder difficulties, falls, or injuries. \par \par He had arterial dopplers done and was told that it was not consistent with peripheral artery disease. \par \par The remaining neurological review of systems is negative. \par

## 2022-01-12 NOTE — ASSESSMENT
[FreeTextEntry1] : Pacheco Rincon is a 71 year old man with peripheral neuropathy.\par Clinically consistent with a painful small fiber peripheral neuropathy most likely due to diabetes mellitus\par   \par EMG/NCS reviewed with patient previously- No large fiber involvement.\par Avoid B6 supplements- pt does not currently take any.\par Continue Gabapentin 800mg nightly will increase by 100mg up to 900mg in the evening (see chart note). Pt encouraged to begin trial of increasing dose when he does not have to go to work to monitor for any sedation.  \par He does not want to increase Gabapentin dose past 900mg nightly. \par Add Lidocaine ointment nightly.\par We discussed possibly adding a second medication for neuropathic pain at next visit. \par \par Obstructive sleep apnea with recent morning headaches- follow up with sleep specialist. He is awaiting CPAP.\par \par Asymptomatic bradycardia and episode of dizziness previous visit- continue follow up with cardiology. \par \par Follow up in 3 months with Dr. Bruno.\par Follow up in 6 months with NP. \par \par I discussed in detail with the patient the diagnosis, prognosis, treatment plan and answered all of his questions.\par \par \par

## 2022-01-12 NOTE — DATA REVIEWED
[de-identified] : 7/7/21 EMG legs- There is no electrophysiologic evidence of a LARGE FIBER polyneuropathy involving the legs. There is no electrophysiologic evidence of a lumbosacral radiculopathy involving the motor axons, bilaterally. The H reflex is absent, bilaterally. This may be normal for age or may be due to a chronic S1 radiculopathy, bilaterally. \par 3/16/21 TSH .184\par 3/15/21 HgA1C 6.6

## 2022-01-12 NOTE — PHYSICAL EXAM
[FreeTextEntry1] : Physical examination limited due to telehealth. \par General: No acute distress, Awake, Alert.   \par  \par \par Mental status \par Awake, alert, gives detailed history.\par \par Cranial Nerves \par    \par III, IV, VI:   EOMI.   \par V: Facial sensation is normal B/L.   \par VII: Facial strength is normal B/L. \par \par \par VIII: Gross hearing is intact.   \par  \par \par IX, X: Palate is midline and elevates symmetrically.   \par XI: Trapezius normal strength.   \par XII: Tongue midline without atrophy or fasciculations. \par \par Motor exam  \par  \par Muscle Strength: moving all four extremities. \par No UE drift.\par  \par \par Sensory  \par Decreased PP to hands and feet in stocking glove distribution.\par  \par  \par \par \par 10/14/21\par Physical examination \par General: No acute distress, Awake, Alert.   \par  \par \par Mental status \par Awake, alert, gives detailed history.\par \par Cranial Nerves \par II: VFF  \par III, IV, VI: PERRL, EOMI.   \par V: Facial sensation is normal B/L.   \par VII: Facial strength is normal B/L. \par \par \par VIII: Gross hearing is intact.   \par  \par \par IX, X: Palate is midline and elevates symmetrically.   \par XI: Trapezius normal strength.   \par XII: Tongue midline without atrophy or fasciculations. \par \par Motor exam  \par Muscle tone - no evidence of rigidity or resistance in all 4 extremities.  \par No atrophy or fasciculations \par Muscle Strength: arms and legs, proximal and distal flexors and extensors are normal \par \par No UE drift.\par \par Reflexes \par All present, normal, and symmetrical.   \par   \par Plantars right: mute.   \par Plantars left: mute.   \par \par Absent ankle jerks.   \par \par Coordination \par Finger to nose: Normal.  \par Heel to shin: Normal.    \par \par Sensory \par Intact proprioception. \par Decreased PP to hands and feet in stocking glove distribution.\par Decreased cold feet. \par Decreased vibration to great toes B.\par  \par mild positive Romberg.\par \par Gait \par Normal including heels, toes, and tandem gait.  \par

## 2022-01-12 NOTE — REASON FOR VISIT
[Home] : at home, [unfilled] , at the time of the visit. [Medical Office: ([facility])___] : at the medical office located in  [Verbal consent obtained from patient] : the patient, [unfilled] [FreeTextEntry1] : neuropathy.

## 2022-04-18 ENCOUNTER — RX RENEWAL (OUTPATIENT)
Age: 72
End: 2022-04-18

## 2022-04-25 ENCOUNTER — RX RENEWAL (OUTPATIENT)
Age: 72
End: 2022-04-25

## 2022-05-05 ENCOUNTER — NON-APPOINTMENT (OUTPATIENT)
Age: 72
End: 2022-05-05

## 2022-05-06 ENCOUNTER — NON-APPOINTMENT (OUTPATIENT)
Age: 72
End: 2022-05-06

## 2022-05-06 ENCOUNTER — APPOINTMENT (OUTPATIENT)
Dept: CARDIOLOGY | Facility: CLINIC | Age: 72
End: 2022-05-06
Payer: COMMERCIAL

## 2022-05-06 VITALS
HEIGHT: 68 IN | HEART RATE: 59 BPM | TEMPERATURE: 97.6 F | BODY MASS INDEX: 30.62 KG/M2 | OXYGEN SATURATION: 99 % | SYSTOLIC BLOOD PRESSURE: 100 MMHG | DIASTOLIC BLOOD PRESSURE: 60 MMHG | WEIGHT: 202 LBS

## 2022-05-06 PROCEDURE — 93000 ELECTROCARDIOGRAM COMPLETE: CPT

## 2022-05-06 PROCEDURE — 99214 OFFICE O/P EST MOD 30 MIN: CPT

## 2022-05-06 RX ORDER — PSYLLIUM HUSK 0.4 G
CAPSULE ORAL
Refills: 0 | Status: ACTIVE | COMMUNITY

## 2022-05-06 RX ORDER — LOSARTAN POTASSIUM 50 MG/1
50 TABLET, FILM COATED ORAL
Refills: 0 | Status: ACTIVE | COMMUNITY

## 2022-05-06 RX ORDER — ALFUZOSIN HYDROCHLORIDE 10 MG/1
10 TABLET, EXTENDED RELEASE ORAL DAILY
Refills: 0 | Status: ACTIVE | COMMUNITY

## 2022-05-06 NOTE — CARDIOLOGY SUMMARY
[de-identified] : 3/9/21 arterial doppler - calcified vessels, borderliine decreased krarie on the left

## 2022-05-06 NOTE — HISTORY OF PRESENT ILLNESS
[FreeTextEntry1] : Mr Rincon has been followed here since 2011 for chest pain, cath showed nonocclusive disease\par .Since last visit he has not been hospitalized. \par \par  He denies , dyspnea, palpitations or syncope.\par He gets occasional upper left pectoral discomfort with emotional stress.\par \par He walks to work twice a week 15 minutes without chest discomfort.\par \par He feels dizzy in the morning, feels his puls is low upper40's-50's.\par \par \par

## 2022-05-23 ENCOUNTER — APPOINTMENT (OUTPATIENT)
Dept: NEUROLOGY | Facility: CLINIC | Age: 72
End: 2022-05-23
Payer: COMMERCIAL

## 2022-05-23 VITALS
DIASTOLIC BLOOD PRESSURE: 72 MMHG | BODY MASS INDEX: 30.92 KG/M2 | HEART RATE: 57 BPM | WEIGHT: 204 LBS | SYSTOLIC BLOOD PRESSURE: 131 MMHG | HEIGHT: 68 IN

## 2022-05-23 PROCEDURE — 99215 OFFICE O/P EST HI 40 MIN: CPT

## 2022-05-23 NOTE — REASON FOR VISIT
[Consultation] : a consultation visit [Home] : at home, [unfilled] , at the time of the visit. [Medical Office: ([facility])___] : at the medical office located in  [Verbal consent obtained from patient] : the patient, [unfilled] [FreeTextEntry1] : neuropathy.

## 2022-05-23 NOTE — ASSESSMENT
[FreeTextEntry1] : Pacheco Rincon is a 71 year old man with peripheral neuropathy.\par Clinically consistent with a painful small fiber peripheral neuropathy most likely due to diabetes mellitus\par   \par EMG/NCS reviewed with patient previously- No large fiber involvement.\par Avoid B6 supplements- pt does not currently take any.\par Can to stop his multivitamin.  He was wondering if he can take it every other day.  With a normal diet, he does not need a multivitamin.\par He will reduce his gabapentin slowly.\par I will begin him on pregabalin.\par He will continue lidocaine ointment nightly.\par \par He will give me an update in about 4 weeks through follow my health.\par \par If she is not noticing improvement, I would like him to consider trying duloxetine.\par \par I discussed in detail with the patient the diagnosis, prognosis, treatment plan and answered all of his questions.\par \par \par

## 2022-05-23 NOTE — DATA REVIEWED
[de-identified] : 7/7/21 EMG legs- There is no electrophysiologic evidence of a LARGE FIBER polyneuropathy involving the legs. There is no electrophysiologic evidence of a lumbosacral radiculopathy involving the motor axons, bilaterally. The H reflex is absent, bilaterally. This may be normal for age or may be due to a chronic S1 radiculopathy, bilaterally. \par 3/16/21 TSH .184\par 3/15/21 HgA1C 6.6

## 2022-05-23 NOTE — HISTORY OF PRESENT ILLNESS
[FreeTextEntry1] : This is a 71-year-old man who is being seen in neurologic consultation for evaluation of neuropathy.  Patient previously followed with my colleague Dr. Eduardo.  He reports significant burning pain which can keep him up at night.  He feels that his leg strength has not changed.  He notes no problems with balance.  He is currently taking gabapentin 900 mg at bedtime which does not control the pain fully.\par \par I reviewed labs noted that his B6 was elevated at 72.  Patient states he does not take any B complex vitamins but does take a regular multivitamin daily.\par His most recent A1c is 6.1.\par \par \par 1/12/22- Milana Pina\par Pacheco Rincon is a 71 year old man with a history of CAD, T2DM, and sleep apnea presenting for a follow up appointment for neuropathic pain.\par \par He is currently taking Gabapentin 800mg at 8pm and it lasts partially throughout the night but he will have burning at times. He has not tried Lidocaine cream in the past. He did not try any higher dose of Gabapentin. He denies weakness or balance difficulties or worsening dizziness. Mr. Rincon is awaiting his CPAP machine for ROSA. \par Does not want to increase Gabapentin dose much further due to possible drowsiness side effect. \par \par The remaining neurological review of systems is negative.\par \par 10/14/21\par Pacheco Rincon is a 70 year old man with a history of CAD, T2DM, and sleep apnea presenting for a follow up appointment for neuropathic pain.\par \par He is currently taking Gabapentin 600mg at 8pm and goes to bed at 10:30pm. Mr. Rincon reports the burning pain in his feet starts in the evening at 8pm. He endorses his pain is acceptable during the day except at times when he wants to walk during lunchtime. He has difficulty sleeping and burning pain during the night. He did not take the additional dose of Gabapentin 100mg due to fear of drowsiness for when he goes to work. Denies weakness or progression of symptoms.\par \par Mr. Rincon endorses he had a general lightheadedness for a few minutes last week and plans on following up with his cardiologist because of the adjustment in his cardiac medications. Denies falls or balance difficulties. Denies the sensation of the room spinning, facial droop, changes in speech, or weakness on one side. Denies any dizziness currently. \par \par He has a CPAP machine for obstructive sleep apnea and wakes up with a morning frontal headache which improves during the day. He has not seen his sleep specialist in a long time.\par \par The remaining neurological review of systems is negative.\par \par 7/15/21 \par Pacheco Rincon is a 70 year old man with a history of CAD, T2DM, and sleep apnea presenting for a follow up appointment for pain in his feet and toes and EMG results.\par \par He endorses he is currently taking Gabapentin 600mg nightly one hour before bedtime. He did not want to increase the dose. The medication provides relief of the pain at night. He continues to have minimal amount of pain during the day, he did not add the Gabapentin 100mg dose yet during the daytime or morning. He denies any weakness.\par \par The remaining neurological review of systems is negative. \par \par 6/10/21\par Pacheco Rincon is a 70 year old man with a history of CAD, T2DM, and sleep apnea presenting for a follow up appointment for pain in his feet and toes-worse at night.\par \par He endorses he is currently on Gabapentin 500mg nightly. He is unsure if it makes him tired or not. He still has burning pain in the morning. He reports sensitivity with the sheets on his feet at night and would like to continue to Gabapentin. Denies any weakness.\par \par The remaining neurological review of systems is negative. \par \par 4/29/21\par Pacheco Rincon is a 70 year old man with a history of CAD, type 2 DM x 3 years, and sleep apnea presenting for a consultation for pain in his feet and toes - worst at night. \par \par He endorses he has had burning pain in both feet for over one year primarily in the sole and toes. His pain fluctuates in intensity and his feet also feel cold at times.  Mr. Rincon denies any weakness. He saw a podiatrist and was told he had diabetic neuropathy. He is currently taking Gabapentin 300mg at night with partial relief. The pain used to wake him up more frequently at night but still disturbs his sleep. He endorses an upset stomach for 1-2 weeks and is not sure if it is medication related. He had increased the medication one week prior to his stomach discomfort and takes Omeprazole and Famotidine. He denies any balance changes, bowel or bladder difficulties, falls, or injuries. \par \par He had arterial dopplers done and was told that it was not consistent with peripheral artery disease. \par \par The remaining neurological review of systems is negative. \par

## 2022-05-27 ENCOUNTER — RX RENEWAL (OUTPATIENT)
Age: 72
End: 2022-05-27

## 2022-06-17 RX ORDER — NEBIVOLOL 5 MG/1
5 TABLET ORAL
Qty: 90 | Refills: 3 | Status: DISCONTINUED | COMMUNITY
Start: 2022-04-18 | End: 2022-06-17

## 2022-06-20 ENCOUNTER — APPOINTMENT (OUTPATIENT)
Dept: CARDIOLOGY | Facility: CLINIC | Age: 72
End: 2022-06-20
Payer: COMMERCIAL

## 2022-06-20 PROCEDURE — 93306 TTE W/DOPPLER COMPLETE: CPT

## 2022-06-22 ENCOUNTER — APPOINTMENT (OUTPATIENT)
Dept: CARDIOLOGY | Facility: CLINIC | Age: 72
End: 2022-06-22

## 2022-07-01 ENCOUNTER — APPOINTMENT (OUTPATIENT)
Dept: CARDIOLOGY | Facility: CLINIC | Age: 72
End: 2022-07-01

## 2022-07-01 VITALS
BODY MASS INDEX: 31.02 KG/M2 | HEART RATE: 61 BPM | OXYGEN SATURATION: 96 % | SYSTOLIC BLOOD PRESSURE: 132 MMHG | DIASTOLIC BLOOD PRESSURE: 64 MMHG | WEIGHT: 204 LBS

## 2022-07-01 PROCEDURE — 99214 OFFICE O/P EST MOD 30 MIN: CPT

## 2022-07-01 PROCEDURE — 36415 COLL VENOUS BLD VENIPUNCTURE: CPT

## 2022-07-01 RX ORDER — GABAPENTIN 300 MG/1
300 CAPSULE ORAL
Qty: 60 | Refills: 3 | Status: DISCONTINUED | COMMUNITY
Start: 2021-04-29 | End: 2022-07-01

## 2022-07-01 NOTE — HISTORY OF PRESENT ILLNESS
[FreeTextEntry1] : Mr Rincon has been followed here since 2011 for chest pain, cath showed nonocclusive disease\par .Since last visit he has not been hospitalized. \par \par  \par He walks to work twice a week 15 minutes without chest discomfort.\par \par \par \par

## 2022-07-01 NOTE — REVIEW OF SYSTEMS
[Weight Gain (___ Lbs)] : no recent weight gain [Weight Loss (___ Lbs)] : no recent weight loss [SOB] : no shortness of breath [Dyspnea on exertion] : not dyspnea during exertion [Chest Discomfort] : no chest discomfort [Lower Ext Edema] : no extremity edema [Palpitations] : no palpitations [Orthopnea] : no orthopnea [Syncope] : no syncope [Myalgia] : no myalgia [Dizziness] : no dizziness [Easy Bleeding] : no tendency for easy bleeding [Easy Bruising] : no tendency for easy bruising [Negative] : Respiratory

## 2022-07-01 NOTE — REASON FOR VISIT
[Arrhythmia/ECG Abnorrmalities] : arrhythmia/ECG abnormalities [FreeTextEntry1] : Previous 2 week Zio showed a 5 second pause, 3 second pause and 5 beat NSVT  Bystolic has been stopped\par Pacheco states HR is now in 60s, was in 50s when on med\par He denies dizziness, states prior to med d/c he had occasional dizziness\par No CP/SOB/palps

## 2022-07-01 NOTE — CARDIOLOGY SUMMARY
[No Ischemia] : no Ischemia [___] : [unfilled] [LVEF ___%] : LVEF [unfilled]% [de-identified] : 2 week o 5/2022 sinus rhythm 5.1 second pause, 3 second pause 5 beat NSVT - bystolic stopped  [de-identified] : 6//20/2022 normal LV function, EF 60-65%, grade I diastolic dysfunction, mild to mod MR, borderline pulmonary hypertension, mildly dilated Ascending Aorta [de-identified] : 3/9/21 arterial doppler - calcified vessels, borderliine decreased karrie on the left

## 2022-07-03 LAB
BUN SERPL-MCNC: 17 MG/DL
CREAT SERPL-MCNC: 0.97 MG/DL
EGFR: 85 ML/MIN/1.73M2

## 2022-08-02 RX ORDER — METOPROLOL TARTRATE 25 MG/1
25 TABLET, FILM COATED ORAL
Qty: 2 | Refills: 0 | Status: COMPLETED | COMMUNITY
Start: 2022-08-02 | End: 2022-09-02

## 2022-08-08 ENCOUNTER — RESULT REVIEW (OUTPATIENT)
Age: 72
End: 2022-08-08

## 2022-08-17 ENCOUNTER — RESULT REVIEW (OUTPATIENT)
Age: 72
End: 2022-08-17

## 2022-09-09 ENCOUNTER — NON-APPOINTMENT (OUTPATIENT)
Age: 72
End: 2022-09-09

## 2022-09-12 ENCOUNTER — APPOINTMENT (OUTPATIENT)
Dept: CARDIOLOGY | Facility: CLINIC | Age: 72
End: 2022-09-12

## 2022-09-12 PROCEDURE — 93242 EXT ECG>48HR<7D RECORDING: CPT

## 2022-09-12 PROCEDURE — 93246 EXT ECG>7D<15D RECORDING: CPT

## 2022-09-14 ENCOUNTER — APPOINTMENT (OUTPATIENT)
Dept: CARDIOLOGY | Facility: CLINIC | Age: 72
End: 2022-09-14

## 2022-09-14 PROCEDURE — 93246 EXT ECG>7D<15D RECORDING: CPT

## 2022-09-14 PROCEDURE — 93244 EXT ECG>48HR<7D REV&INTERPJ: CPT

## 2022-09-23 ENCOUNTER — APPOINTMENT (OUTPATIENT)
Dept: NEUROLOGY | Facility: CLINIC | Age: 72
End: 2022-09-23

## 2022-09-23 VITALS
TEMPERATURE: 98.7 F | HEART RATE: 66 BPM | WEIGHT: 205 LBS | SYSTOLIC BLOOD PRESSURE: 132 MMHG | HEIGHT: 68 IN | DIASTOLIC BLOOD PRESSURE: 76 MMHG | BODY MASS INDEX: 31.07 KG/M2 | OXYGEN SATURATION: 95 %

## 2022-09-23 PROCEDURE — 99214 OFFICE O/P EST MOD 30 MIN: CPT

## 2022-09-28 NOTE — PHYSICAL EXAM
[FreeTextEntry1] : Physical examination \par General: No acute distress, Awake, Alert. \par \par Mental status \par Awake, alert, and oriented to person, time and place, Normal attention span and concentration, Recent and remote memory intact, Language intact, Fund of knowledge intact. \par Cranial Nerves \par II: VFF \par III, IV, VI: PERRL, EOMI. \par V: Facial sensation is normal B/L. \par VII: Facial strength is normal B/L. \par VIII: Gross hearing is intact. \par IX, X: Palate is midline and elevates symmetrically. \par XI: Trapezius normal strength. \par XII: Tongue midline without atrophy or fasciculations. \par \par Motor exam \par Muscle tone - no evidence of rigidity or resistance in all 4 extremities. \par No atrophy or fasciculations \par Muscle Strength: arms and legs, proximal and distal flexors and extensors are normal \par No UE drift.\par \par \par Reflexes \par All present, normal, and symmetrical. \par Plantars right: mute. \par Plantars left: mute. \par Absent ankle jerks. \par \par Coordination \par Finger to nose: Normal. \par Heel to shin: Normal. \par \par Sensory \par Intact proprioception. \par Decreased PP to hands and feet in stocking glove distribution.\par Decreased cold feet. \par Decreased vibration to great toes B.\par Romberg present\par \par Reflexes\par absent ankles\par \par Gait \par Normal\par \par \par \par  \par \par \par \par

## 2022-09-28 NOTE — HISTORY OF PRESENT ILLNESS
[FreeTextEntry1] : 9/23/22\par \par \par \par \par 5/23/22\par This is a 71-year-old man who is being seen in neurologic consultation for evaluation of neuropathy.  Patient previously followed with my colleague Dr. Eduardo.  He reports significant burning pain which can keep him up at night.  He feels that his leg strength has not changed.  He notes no problems with balance.  He is currently taking gabapentin 900 mg at bedtime which does not control the pain fully.\par \par I reviewed labs noted that his B6 was elevated at 72.  Patient states he does not take any B complex vitamins but does take a regular multivitamin daily.\par His most recent A1c is 6.1.\par \par \par 1/12/22- Milana Pina\par Pacheco Rincon is a 71 year old man with a history of CAD, T2DM, and sleep apnea presenting for a follow up appointment for neuropathic pain.\par \par He is currently taking Gabapentin 800mg at 8pm and it lasts partially throughout the night but he will have burning at times. He has not tried Lidocaine cream in the past. He did not try any higher dose of Gabapentin. He denies weakness or balance difficulties or worsening dizziness. Mr. Rincon is awaiting his CPAP machine for ROSA. \par Does not want to increase Gabapentin dose much further due to possible drowsiness side effect. \par \par The remaining neurological review of systems is negative.\par \par 10/14/21\par Pacheco Rincon is a 70 year old man with a history of CAD, T2DM, and sleep apnea presenting for a follow up appointment for neuropathic pain.\par \par He is currently taking Gabapentin 600mg at 8pm and goes to bed at 10:30pm. Mr. Rincon reports the burning pain in his feet starts in the evening at 8pm. He endorses his pain is acceptable during the day except at times when he wants to walk during lunchtime. He has difficulty sleeping and burning pain during the night. He did not take the additional dose of Gabapentin 100mg due to fear of drowsiness for when he goes to work. Denies weakness or progression of symptoms.\par \par Mr. Rincon endorses he had a general lightheadedness for a few minutes last week and plans on following up with his cardiologist because of the adjustment in his cardiac medications. Denies falls or balance difficulties. Denies the sensation of the room spinning, facial droop, changes in speech, or weakness on one side. Denies any dizziness currently. \par \par He has a CPAP machine for obstructive sleep apnea and wakes up with a morning frontal headache which improves during the day. He has not seen his sleep specialist in a long time.\par \par The remaining neurological review of systems is negative.\par \par 7/15/21 \par Pacheco Rincon is a 70 year old man with a history of CAD, T2DM, and sleep apnea presenting for a follow up appointment for pain in his feet and toes and EMG results.\par \par He endorses he is currently taking Gabapentin 600mg nightly one hour before bedtime. He did not want to increase the dose. The medication provides relief of the pain at night. He continues to have minimal amount of pain during the day, he did not add the Gabapentin 100mg dose yet during the daytime or morning. He denies any weakness.\par \par The remaining neurological review of systems is negative. \par \par 6/10/21\par Pacheco Rincon is a 70 year old man with a history of CAD, T2DM, and sleep apnea presenting for a follow up appointment for pain in his feet and toes-worse at night.\par \par He endorses he is currently on Gabapentin 500mg nightly. He is unsure if it makes him tired or not. He still has burning pain in the morning. He reports sensitivity with the sheets on his feet at night and would like to continue to Gabapentin. Denies any weakness.\par \par The remaining neurological review of systems is negative. \par \par 4/29/21\par Pacheco Rincon is a 70 year old man with a history of CAD, type 2 DM x 3 years, and sleep apnea presenting for a consultation for pain in his feet and toes - worst at night. \par \par He endorses he has had burning pain in both feet for over one year primarily in the sole and toes. His pain fluctuates in intensity and his feet also feel cold at times.  Mr. Rincon denies any weakness. He saw a podiatrist and was told he had diabetic neuropathy. He is currently taking Gabapentin 300mg at night with partial relief. The pain used to wake him up more frequently at night but still disturbs his sleep. He endorses an upset stomach for 1-2 weeks and is not sure if it is medication related. He had increased the medication one week prior to his stomach discomfort and takes Omeprazole and Famotidine. He denies any balance changes, bowel or bladder difficulties, falls, or injuries. \par \par He had arterial dopplers done and was told that it was not consistent with peripheral artery disease. \par \par The remaining neurological review of systems is negative. \par

## 2022-09-28 NOTE — DATA REVIEWED
[de-identified] : 7/7/21 EMG legs- There is no electrophysiologic evidence of a LARGE FIBER polyneuropathy involving the legs. There is no electrophysiologic evidence of a lumbosacral radiculopathy involving the motor axons, bilaterally. The H reflex is absent, bilaterally. This may be normal for age or may be due to a chronic S1 radiculopathy, bilaterally. \par 3/16/21 TSH .184\par 3/15/21 HgA1C 6.6

## 2022-09-28 NOTE — ASSESSMENT
[FreeTextEntry1] : Pacheco Rincon is a 71 year old man with peripheral neuropathy.\par Clinically consistent with a painful small fiber peripheral neuropathy most likely due to diabetes mellitus\par   \par Continue Pregabain 150 mg qhs.\par \par He will continue lidocaine ointment nightly.\par \par Consider duloxetine in the future-\par \par I discussed in detail with the patient the diagnosis, prognosis, treatment plan and answered all of his questions.\par \par \par

## 2022-09-28 NOTE — CONSULT LETTER
[Dear  ___] : Dear  [unfilled], [Consult Letter:] : I had the pleasure of evaluating your patient, [unfilled]. [Please see my note below.] : Please see my note below. [FreeTextEntry3] : Sincerely,\par \par Mary Bruno M.D.\par

## 2022-10-17 ENCOUNTER — RX RENEWAL (OUTPATIENT)
Age: 72
End: 2022-10-17

## 2022-11-26 ENCOUNTER — NON-APPOINTMENT (OUTPATIENT)
Age: 72
End: 2022-11-26

## 2022-11-28 ENCOUNTER — APPOINTMENT (OUTPATIENT)
Dept: CARDIOLOGY | Facility: CLINIC | Age: 72
End: 2022-11-28

## 2022-11-28 ENCOUNTER — NON-APPOINTMENT (OUTPATIENT)
Age: 72
End: 2022-11-28

## 2022-11-28 VITALS
WEIGHT: 209 LBS | SYSTOLIC BLOOD PRESSURE: 130 MMHG | DIASTOLIC BLOOD PRESSURE: 68 MMHG | BODY MASS INDEX: 31.67 KG/M2 | HEIGHT: 68 IN

## 2022-11-28 PROCEDURE — 93000 ELECTROCARDIOGRAM COMPLETE: CPT

## 2022-11-28 PROCEDURE — 99214 OFFICE O/P EST MOD 30 MIN: CPT

## 2022-11-28 NOTE — CARDIOLOGY SUMMARY
[No Ischemia] : no Ischemia [___] : [unfilled] [LVEF ___%] : LVEF [unfilled]% [de-identified] : jazmin- 6/22 - average rate 67, no afib [de-identified] : 8/8/2022- FFR negative, calcium score 379 severe om3,  rca, nonocclusive elsewhere, densee plaque in small diagonals, [de-identified] : 3/9/21 arterial doppler - calcified vessels, borderliine decreased karrie on the left

## 2022-11-28 NOTE — HISTORY OF PRESENT ILLNESS
[FreeTextEntry1] : Mr Rincon has been followed here since 2011 for chest pain, cath showed nonocclusive disease\par \par .Since last visit he has not been hospitalized. \par \par  He denies , dyspnea, palpitations or syncope.\par He gets occasional upper left pectoral discomfort with emotional stress.\par  He is not  exercising.\par \par \par \par \par \par

## 2023-03-27 ENCOUNTER — APPOINTMENT (OUTPATIENT)
Dept: NEUROLOGY | Facility: CLINIC | Age: 73
End: 2023-03-27
Payer: COMMERCIAL

## 2023-03-27 VITALS
BODY MASS INDEX: 31.37 KG/M2 | HEART RATE: 60 BPM | HEIGHT: 68 IN | WEIGHT: 207 LBS | OXYGEN SATURATION: 95 % | SYSTOLIC BLOOD PRESSURE: 161 MMHG | DIASTOLIC BLOOD PRESSURE: 88 MMHG

## 2023-03-27 PROCEDURE — 99214 OFFICE O/P EST MOD 30 MIN: CPT

## 2023-03-29 NOTE — HISTORY OF PRESENT ILLNESS
[FreeTextEntry1] : 3/27/23\par Here in f/u.\par at night the painn can get worse and keeps him awake.\par able to tolerate pregabalin 150 mg qhs\par \par He is afraid to add more medication due to possible side effects-\par \par No complaints of leg weakness or pain.\par \par \par 9/23/22\par \par 5/23/22\par This is a 71-year-old man who is being seen in neurologic consultation for evaluation of neuropathy.  Patient previously followed with my colleague Dr. Eduardo.  He reports significant burning pain which can keep him up at night.  He feels that his leg strength has not changed.  He notes no problems with balance.  He is currently taking gabapentin 900 mg at bedtime which does not control the pain fully.\par \par I reviewed labs noted that his B6 was elevated at 72.  Patient states he does not take any B complex vitamins but does take a regular multivitamin daily.\par His most recent A1c is 6.1.\par \par \par 1/12/22- Milana Pina\par Pacheco Rincon is a 71 year old man with a history of CAD, T2DM, and sleep apnea presenting for a follow up appointment for neuropathic pain.\par \par He is currently taking Gabapentin 800mg at 8pm and it lasts partially throughout the night but he will have burning at times. He has not tried Lidocaine cream in the past. He did not try any higher dose of Gabapentin. He denies weakness or balance difficulties or worsening dizziness. Mr. Rincon is awaiting his CPAP machine for ROSA. \par Does not want to increase Gabapentin dose much further due to possible drowsiness side effect. \par \par The remaining neurological review of systems is negative.\par \par 10/14/21\par Pacheco Rincon is a 70 year old man with a history of CAD, T2DM, and sleep apnea presenting for a follow up appointment for neuropathic pain.\par \par He is currently taking Gabapentin 600mg at 8pm and goes to bed at 10:30pm. Mr. Rincon reports the burning pain in his feet starts in the evening at 8pm. He endorses his pain is acceptable during the day except at times when he wants to walk during lunchtime. He has difficulty sleeping and burning pain during the night. He did not take the additional dose of Gabapentin 100mg due to fear of drowsiness for when he goes to work. Denies weakness or progression of symptoms.\par \par Mr. Rincon endorses he had a general lightheadedness for a few minutes last week and plans on following up with his cardiologist because of the adjustment in his cardiac medications. Denies falls or balance difficulties. Denies the sensation of the room spinning, facial droop, changes in speech, or weakness on one side. Denies any dizziness currently. \par \par He has a CPAP machine for obstructive sleep apnea and wakes up with a morning frontal headache which improves during the day. He has not seen his sleep specialist in a long time.\par \par The remaining neurological review of systems is negative.\par \par 7/15/21 \par Pacheco Rincon is a 70 year old man with a history of CAD, T2DM, and sleep apnea presenting for a follow up appointment for pain in his feet and toes and EMG results.\par \par He endorses he is currently taking Gabapentin 600mg nightly one hour before bedtime. He did not want to increase the dose. The medication provides relief of the pain at night. He continues to have minimal amount of pain during the day, he did not add the Gabapentin 100mg dose yet during the daytime or morning. He denies any weakness.\par \par The remaining neurological review of systems is negative. \par \par 6/10/21\par Pacheco Rincon is a 70 year old man with a history of CAD, T2DM, and sleep apnea presenting for a follow up appointment for pain in his feet and toes-worse at night.\par \par He endorses he is currently on Gabapentin 500mg nightly. He is unsure if it makes him tired or not. He still has burning pain in the morning. He reports sensitivity with the sheets on his feet at night and would like to continue to Gabapentin. Denies any weakness.\par \par The remaining neurological review of systems is negative. \par \par 4/29/21\par Pacheco Rincon is a 70 year old man with a history of CAD, type 2 DM x 3 years, and sleep apnea presenting for a consultation for pain in his feet and toes - worst at night. \par \par He endorses he has had burning pain in both feet for over one year primarily in the sole and toes. His pain fluctuates in intensity and his feet also feel cold at times.  Mr. Rincon denies any weakness. He saw a podiatrist and was told he had diabetic neuropathy. He is currently taking Gabapentin 300mg at night with partial relief. The pain used to wake him up more frequently at night but still disturbs his sleep. He endorses an upset stomach for 1-2 weeks and is not sure if it is medication related. He had increased the medication one week prior to his stomach discomfort and takes Omeprazole and Famotidine. He denies any balance changes, bowel or bladder difficulties, falls, or injuries. \par \par He had arterial dopplers done and was told that it was not consistent with peripheral artery disease. \par \par The remaining neurological review of systems is negative. \par

## 2023-03-29 NOTE — ASSESSMENT
[FreeTextEntry1] : Pacheco Rincon is a 71 year old man with peripheral neuropathy.\par Clinically consistent with a painful small fiber peripheral neuropathy most likely due to diabetes mellitus\par   \par Increase Pregabalin to 200 qhs.\par \par Consider duloxetine in the future-not wanting to add at present although I think it would be quite beneficial for his symptoms.\par \par I discussed in detail with the patient the diagnosis, prognosis, treatment plan and answered all of his questions.\par \par \par

## 2023-03-29 NOTE — DATA REVIEWED
[de-identified] : 7/7/21 EMG legs- There is no electrophysiologic evidence of a LARGE FIBER polyneuropathy involving the legs. There is no electrophysiologic evidence of a lumbosacral radiculopathy involving the motor axons, bilaterally. The H reflex is absent, bilaterally. This may be normal for age or may be due to a chronic S1 radiculopathy, bilaterally. \par 3/16/21 TSH .184\par 3/15/21 HgA1C 6.6

## 2023-05-03 ENCOUNTER — RX RENEWAL (OUTPATIENT)
Age: 73
End: 2023-05-03

## 2023-05-22 ENCOUNTER — RX RENEWAL (OUTPATIENT)
Age: 73
End: 2023-05-22

## 2023-06-04 ENCOUNTER — NON-APPOINTMENT (OUTPATIENT)
Age: 73
End: 2023-06-04

## 2023-06-05 ENCOUNTER — NON-APPOINTMENT (OUTPATIENT)
Age: 73
End: 2023-06-05

## 2023-06-05 ENCOUNTER — APPOINTMENT (OUTPATIENT)
Dept: CARDIOLOGY | Facility: CLINIC | Age: 73
End: 2023-06-05
Payer: COMMERCIAL

## 2023-06-05 VITALS
WEIGHT: 109 LBS | BODY MASS INDEX: 21.4 KG/M2 | SYSTOLIC BLOOD PRESSURE: 140 MMHG | HEIGHT: 60 IN | DIASTOLIC BLOOD PRESSURE: 68 MMHG

## 2023-06-05 PROCEDURE — 36415 COLL VENOUS BLD VENIPUNCTURE: CPT

## 2023-06-05 PROCEDURE — 93000 ELECTROCARDIOGRAM COMPLETE: CPT

## 2023-06-05 PROCEDURE — 99215 OFFICE O/P EST HI 40 MIN: CPT

## 2023-06-05 NOTE — HISTORY OF PRESENT ILLNESS
[FreeTextEntry1] : Mr Rincon has been followed here since 2011 for chest pain, cath showed nonocclusive disease\par \par .Since last visit he has not been hospitalized. He is treated fro diabetic neuropathy.  He brings in labs in April which we reviewed, he is anemic mildly thrombocytopenic.\par He will be seeing  Dr. Fuentes this month for chronic right upper quadrant discomfort.Stool heme is positive. \par \par  He denies , dyspnea, palpitations or syncope.  He has lightheadedness with change in position and sometimes in the morning.\par He gets occasional upper left pectoral discomfort with emotional stress, driving,not with exertion. \par  He is not  exercising.\par \par \par \par \par \par

## 2023-06-05 NOTE — CARDIOLOGY SUMMARY
[___] : [unfilled] [de-identified] : jazmin- 6/22 - average rate 67, no afib [de-identified] : 8/8/2022- FFR negative, calcium score 379 severe om3,  rca, nonocclusive elsewhere, densee plaque in small diagonals, [de-identified] : 3/9/21 arterial doppler - calcified vessels, borderliine decreased karrie on the left

## 2023-06-20 ENCOUNTER — RX RENEWAL (OUTPATIENT)
Age: 73
End: 2023-06-20

## 2023-06-28 ENCOUNTER — APPOINTMENT (OUTPATIENT)
Dept: GASTROENTEROLOGY | Facility: CLINIC | Age: 73
End: 2023-06-28
Payer: COMMERCIAL

## 2023-06-28 VITALS
HEIGHT: 60 IN | SYSTOLIC BLOOD PRESSURE: 120 MMHG | BODY MASS INDEX: 41.03 KG/M2 | WEIGHT: 209 LBS | DIASTOLIC BLOOD PRESSURE: 60 MMHG

## 2023-06-28 DIAGNOSIS — Z12.11 ENCOUNTER FOR SCREENING FOR MALIGNANT NEOPLASM OF COLON: ICD-10-CM

## 2023-06-28 DIAGNOSIS — Z86.010 PERSONAL HISTORY OF COLONIC POLYPS: ICD-10-CM

## 2023-06-28 DIAGNOSIS — Z80.0 ENCOUNTER FOR SCREENING FOR MALIGNANT NEOPLASM OF COLON: ICD-10-CM

## 2023-06-28 DIAGNOSIS — K21.9 GASTRO-ESOPHAGEAL REFLUX DISEASE W/OUT ESOPHAGITIS: ICD-10-CM

## 2023-06-28 PROCEDURE — 99215 OFFICE O/P EST HI 40 MIN: CPT

## 2023-06-28 NOTE — ASSESSMENT
[FreeTextEntry1] : 1. History of colon polyp / family h/o colon cancer / BRBPR: Colonoscopy planned for screening / follow up and to document hemorrhoids as source of occasional BRBPR\par \par 2. Abdominal pain:  CT if not already perforemd by PMD recentluy\par \par 3. GERD:  Continue dietary  / lifestyle modification along with PPI. EGD planned ...if negative , will try to wean down meds\par \par Pertinent available records reviewed\par Risks of the procedures including but not limited to bleeding / perforation / infection / anesthesia complication / missed  lesions explained to the  patient . The patient expressed understanding and a desire to proceed with the procedures.\par \par Risk of not doing procedures includes but is not limited to missed or delayed diagnosis of gastric pathology, colon cancer or other gastrointestinal pathology\par \par A consultation note was provided to the referring provider\par The patient is at increased risk of anesthesia / procedure related complications  secondary to ROSA\par

## 2023-06-28 NOTE — HISTORY OF PRESENT ILLNESS
[de-identified] : Presents for f/u with c/o abdominal pain and BRBPR. Abdominal pain is similar to previous..right sided  / mostly noticeable at night. CT scan in 2019 / 2017 were unremarkable. May have has CT with PMD more recently.\par \par Additionally with GERD controlle don PPI / Pepcid.  Has not tried to wean down.\par \par Denies nausea,vomiting, fever, chills, diarrrhea, constipation, melena, hematemesis\par \par Records faxed to [facility] document guaiac positive stool in 1/2 samples. Labs from 4/2023: normal CMET /  HGB = 12.5 with normal MCV / normal B12  and folate\par \par -EGD / colonoscopy 10/2020 was notable for gastritis / fundal polyps / hemorrhoids / diverticulosis / colon polyps. Indication was colon cancer screening and  a 6 month h/o of mild i/ intermittent RLQ pain ( no  clear precipitating  / alleviating factors ) . Noticeable mostly while lying down at nignt.\par \par  Denies nausea, vomiting, fever, chills, diarrhea, constipation, melena, hematemesis. \par \par - Colonoscopy in 9/2015 notable for diverticulosis , hemorrhoids, 1 adenoma, 1 sessile serrated polyp and 1 hyperplastic polyp\par - EGD 11/2015 for GERD notable  for fundal polyps, H. pylori associated gastritis ( treated..with documented eradication) \par -Colonoscopy in 2011 and 2004 ( family h/o colon cancer in brother)..polyps / diverticulosis / hemorrhoids. \par - Colonoscopy in 2006 was unremarkable\par \par

## 2023-06-28 NOTE — CONSULT LETTER
[Dear  ___] : Dear  [unfilled], [Please see my note below.] : Please see my note below. [Consult Closing:] : Thank you very much for allowing me to participate in the care of this patient.  If you have any questions, please do not hesitate to contact me. [Sincerely,] : Sincerely, [Courtesy Letter:] : I had the pleasure of seeing your patient, [unfilled], in my office today. [FreeTextEntry3] : Eddie Fuentes MD\par tel: [phone]\par fax: [phone]\par

## 2023-06-30 ENCOUNTER — APPOINTMENT (OUTPATIENT)
Dept: CARDIOLOGY | Facility: CLINIC | Age: 73
End: 2023-06-30

## 2023-07-31 ENCOUNTER — APPOINTMENT (OUTPATIENT)
Dept: CARDIOLOGY | Facility: CLINIC | Age: 73
End: 2023-07-31

## 2023-08-07 ENCOUNTER — TRANSCRIPTION ENCOUNTER (OUTPATIENT)
Age: 73
End: 2023-08-07

## 2023-08-07 ENCOUNTER — APPOINTMENT (OUTPATIENT)
Dept: CARDIOLOGY | Facility: CLINIC | Age: 73
End: 2023-08-07
Payer: COMMERCIAL

## 2023-08-07 PROCEDURE — 93306 TTE W/DOPPLER COMPLETE: CPT

## 2023-08-11 ENCOUNTER — APPOINTMENT (OUTPATIENT)
Dept: CARDIOLOGY | Facility: CLINIC | Age: 73
End: 2023-08-11
Payer: COMMERCIAL

## 2023-08-11 ENCOUNTER — TRANSCRIPTION ENCOUNTER (OUTPATIENT)
Age: 73
End: 2023-08-11

## 2023-08-11 PROCEDURE — 93351 STRESS TTE COMPLETE: CPT

## 2023-08-11 PROCEDURE — ZZZZZ: CPT

## 2023-09-08 ENCOUNTER — RESULT REVIEW (OUTPATIENT)
Age: 73
End: 2023-09-08

## 2023-09-12 ENCOUNTER — APPOINTMENT (OUTPATIENT)
Dept: GASTROENTEROLOGY | Facility: HOSPITAL | Age: 73
End: 2023-09-12

## 2023-09-17 ENCOUNTER — RESULT REVIEW (OUTPATIENT)
Age: 73
End: 2023-09-17

## 2023-09-18 ENCOUNTER — APPOINTMENT (OUTPATIENT)
Dept: GASTROENTEROLOGY | Facility: HOSPITAL | Age: 73
End: 2023-09-18

## 2023-10-02 ENCOUNTER — APPOINTMENT (OUTPATIENT)
Dept: NEUROLOGY | Facility: CLINIC | Age: 73
End: 2023-10-02
Payer: COMMERCIAL

## 2023-10-02 VITALS
DIASTOLIC BLOOD PRESSURE: 67 MMHG | WEIGHT: 206 LBS | HEART RATE: 53 BPM | OXYGEN SATURATION: 95 % | SYSTOLIC BLOOD PRESSURE: 122 MMHG | BODY MASS INDEX: 32.33 KG/M2 | HEIGHT: 67 IN

## 2023-10-02 DIAGNOSIS — G62.9 POLYNEUROPATHY, UNSPECIFIED: ICD-10-CM

## 2023-10-02 PROCEDURE — 99214 OFFICE O/P EST MOD 30 MIN: CPT

## 2023-10-02 RX ORDER — ATORVASTATIN CALCIUM 20 MG/1
20 TABLET, FILM COATED ORAL
Refills: 0 | Status: ACTIVE | COMMUNITY

## 2023-10-04 ENCOUNTER — NON-APPOINTMENT (OUTPATIENT)
Age: 73
End: 2023-10-04

## 2023-12-02 ENCOUNTER — NON-APPOINTMENT (OUTPATIENT)
Age: 73
End: 2023-12-02

## 2023-12-02 DIAGNOSIS — D64.9 ANEMIA, UNSPECIFIED: ICD-10-CM

## 2023-12-02 DIAGNOSIS — Z87.898 PERSONAL HISTORY OF OTHER SPECIFIED CONDITIONS: ICD-10-CM

## 2023-12-02 DIAGNOSIS — R19.5 OTHER FECAL ABNORMALITIES: ICD-10-CM

## 2023-12-02 DIAGNOSIS — M54.31 SCIATICA, RIGHT SIDE: ICD-10-CM

## 2023-12-04 ENCOUNTER — APPOINTMENT (OUTPATIENT)
Dept: CARDIOLOGY | Facility: CLINIC | Age: 73
End: 2023-12-04
Payer: COMMERCIAL

## 2023-12-04 ENCOUNTER — NON-APPOINTMENT (OUTPATIENT)
Age: 73
End: 2023-12-04

## 2023-12-04 VITALS
WEIGHT: 209 LBS | SYSTOLIC BLOOD PRESSURE: 138 MMHG | DIASTOLIC BLOOD PRESSURE: 78 MMHG | HEIGHT: 67 IN | BODY MASS INDEX: 32.8 KG/M2

## 2023-12-04 DIAGNOSIS — G47.30 SLEEP APNEA, UNSPECIFIED: ICD-10-CM

## 2023-12-04 DIAGNOSIS — K62.5 HEMORRHAGE OF ANUS AND RECTUM: ICD-10-CM

## 2023-12-04 DIAGNOSIS — R07.9 CHEST PAIN, UNSPECIFIED: ICD-10-CM

## 2023-12-04 DIAGNOSIS — I77.819 AORTIC ECTASIA, UNSPECIFIED SITE: ICD-10-CM

## 2023-12-04 DIAGNOSIS — E66.9 OBESITY, UNSPECIFIED: ICD-10-CM

## 2023-12-04 DIAGNOSIS — I25.10 ATHEROSCLEROTIC HEART DISEASE OF NATIVE CORONARY ARTERY W/OUT ANGINA PECTORIS: ICD-10-CM

## 2023-12-04 DIAGNOSIS — R42 DIZZINESS AND GIDDINESS: ICD-10-CM

## 2023-12-04 DIAGNOSIS — E78.5 HYPERLIPIDEMIA, UNSPECIFIED: ICD-10-CM

## 2023-12-04 DIAGNOSIS — R79.89 OTHER SPECIFIED ABNORMAL FINDINGS OF BLOOD CHEMISTRY: ICD-10-CM

## 2023-12-04 DIAGNOSIS — I34.0 NONRHEUMATIC MITRAL (VALVE) INSUFFICIENCY: ICD-10-CM

## 2023-12-04 DIAGNOSIS — E11.9 TYPE 2 DIABETES MELLITUS W/OUT COMPLICATIONS: ICD-10-CM

## 2023-12-04 DIAGNOSIS — I10 ESSENTIAL (PRIMARY) HYPERTENSION: ICD-10-CM

## 2023-12-04 DIAGNOSIS — R00.1 BRADYCARDIA, UNSPECIFIED: ICD-10-CM

## 2023-12-04 PROCEDURE — 99215 OFFICE O/P EST HI 40 MIN: CPT | Mod: 25

## 2023-12-04 PROCEDURE — 93000 ELECTROCARDIOGRAM COMPLETE: CPT

## 2024-02-26 RX ORDER — PREGABALIN 50 MG/1
50 CAPSULE ORAL
Qty: 120 | Refills: 5 | Status: ACTIVE | COMMUNITY
Start: 2022-05-23 | End: 1900-01-01

## 2024-05-13 ENCOUNTER — RX RENEWAL (OUTPATIENT)
Age: 74
End: 2024-05-13

## 2024-05-13 RX ORDER — RANOLAZINE 500 MG/1
500 TABLET, EXTENDED RELEASE ORAL
Qty: 180 | Refills: 3 | Status: ACTIVE | COMMUNITY
Start: 2018-03-19 | End: 1900-01-01

## 2024-06-04 ENCOUNTER — RX RENEWAL (OUTPATIENT)
Age: 74
End: 2024-06-04

## 2024-06-04 RX ORDER — DULOXETINE HYDROCHLORIDE 30 MG/1
30 CAPSULE, DELAYED RELEASE PELLETS ORAL
Qty: 180 | Refills: 1 | Status: ACTIVE | COMMUNITY
Start: 2023-10-02 | End: 1900-01-01

## 2024-06-22 ENCOUNTER — RX RENEWAL (OUTPATIENT)
Age: 74
End: 2024-06-22

## 2024-06-22 RX ORDER — ATORVASTATIN CALCIUM 20 MG/1
20 TABLET, FILM COATED ORAL DAILY
Qty: 90 | Refills: 3 | Status: ACTIVE | COMMUNITY
Start: 2018-04-23 | End: 1900-01-01

## 2024-07-25 ENCOUNTER — NON-APPOINTMENT (OUTPATIENT)
Age: 74
End: 2024-07-25

## 2024-07-26 ENCOUNTER — APPOINTMENT (OUTPATIENT)
Dept: CARDIOLOGY | Facility: CLINIC | Age: 74
End: 2024-07-26
Payer: COMMERCIAL

## 2024-07-26 ENCOUNTER — NON-APPOINTMENT (OUTPATIENT)
Age: 74
End: 2024-07-26

## 2024-07-26 VITALS
HEIGHT: 67 IN | DIASTOLIC BLOOD PRESSURE: 62 MMHG | HEART RATE: 60 BPM | WEIGHT: 209 LBS | SYSTOLIC BLOOD PRESSURE: 111 MMHG | BODY MASS INDEX: 32.8 KG/M2 | OXYGEN SATURATION: 96 %

## 2024-07-26 DIAGNOSIS — E78.5 HYPERLIPIDEMIA, UNSPECIFIED: ICD-10-CM

## 2024-07-26 DIAGNOSIS — E66.9 OBESITY, UNSPECIFIED: ICD-10-CM

## 2024-07-26 DIAGNOSIS — I77.819 AORTIC ECTASIA, UNSPECIFIED SITE: ICD-10-CM

## 2024-07-26 DIAGNOSIS — G47.30 SLEEP APNEA, UNSPECIFIED: ICD-10-CM

## 2024-07-26 DIAGNOSIS — I10 ESSENTIAL (PRIMARY) HYPERTENSION: ICD-10-CM

## 2024-07-26 DIAGNOSIS — R42 DIZZINESS AND GIDDINESS: ICD-10-CM

## 2024-07-26 DIAGNOSIS — I34.0 NONRHEUMATIC MITRAL (VALVE) INSUFFICIENCY: ICD-10-CM

## 2024-07-26 DIAGNOSIS — R00.1 BRADYCARDIA, UNSPECIFIED: ICD-10-CM

## 2024-07-26 DIAGNOSIS — I25.10 ATHEROSCLEROTIC HEART DISEASE OF NATIVE CORONARY ARTERY W/OUT ANGINA PECTORIS: ICD-10-CM

## 2024-07-26 DIAGNOSIS — R07.9 CHEST PAIN, UNSPECIFIED: ICD-10-CM

## 2024-07-26 PROCEDURE — 93000 ELECTROCARDIOGRAM COMPLETE: CPT

## 2024-07-26 PROCEDURE — G2211 COMPLEX E/M VISIT ADD ON: CPT | Mod: NC

## 2024-07-26 PROCEDURE — 99214 OFFICE O/P EST MOD 30 MIN: CPT

## 2024-07-26 NOTE — CARDIOLOGY SUMMARY
[de-identified] : jazmin- 6/22 - average rate 67, no afib [de-identified] : 8/8/2022- FFR negative, calcium score 379 severe om3,  rca, nonocclusive elsewhere, dense plaque in small diagonals, [de-identified] : 3/9/21 arterial doppler - calcified vessels, borderliine decreased karrie on the left

## 2024-07-26 NOTE — HISTORY OF PRESENT ILLNESS
[FreeTextEntry1] : Mr Rincon has been followed here since 2011 for chest pain, cath showed nonocclusive disease  Since last visit he had endoscopy and colonoscopy done for anemia and thrombocytopenia.  There have been no hospitalizations.   He denies , dyspnea, palpitations or syncope.  He has lightheadedness with change in position and sometimes in the morning. He gets occasional upper left pectoral discomfort with emotional stress, driving, not with exertion. sporadic, hasn't had it lately  He is not  exercising.

## 2024-09-09 ENCOUNTER — APPOINTMENT (OUTPATIENT)
Dept: NEUROLOGY | Facility: CLINIC | Age: 74
End: 2024-09-09
Payer: COMMERCIAL

## 2024-09-09 VITALS
SYSTOLIC BLOOD PRESSURE: 149 MMHG | DIASTOLIC BLOOD PRESSURE: 75 MMHG | HEIGHT: 67 IN | OXYGEN SATURATION: 96 % | WEIGHT: 209 LBS | HEART RATE: 62 BPM | BODY MASS INDEX: 32.8 KG/M2

## 2024-09-09 DIAGNOSIS — G62.9 POLYNEUROPATHY, UNSPECIFIED: ICD-10-CM

## 2024-09-09 PROCEDURE — 99214 OFFICE O/P EST MOD 30 MIN: CPT

## 2024-09-09 RX ORDER — SITAGLIPTIN 25 MG/1
25 TABLET, FILM COATED ORAL
Refills: 0 | Status: ACTIVE | COMMUNITY

## 2024-09-09 NOTE — ASSESSMENT
[FreeTextEntry1] : Pacheco Rincon is a 71 year old man with peripheral neuropathy. Clinically consistent with a painful small fiber peripheral neuropathy most likely due to diabetes mellitus    Continue pregabalin 150 mg nightly- may take 200 mg if needed on days with more pain.  Continue Duloxetine 30 mg - does not want to increase to 60 mg.  Discussed Lamictal as an alternative add on - he declined.  f/u in 6 months.

## 2024-09-09 NOTE — PHYSICAL EXAM
[FreeTextEntry1] : Physical examination  General: No acute distress, Awake, Alert.   Mental status  Awake, alert, and oriented to person, time and place, Normal attention span and concentration, Recent and remote memory intact, Language intact, Fund of knowledge intact.  Cranial Nerves  II: VFF  III, IV, VI: PERRL, EOMI.  V: Facial sensation is normal B/L.  VII: Facial strength is normal B/L.  VIII: Gross hearing is intact.  IX, X: Palate is midline and elevates symmetrically.  XI: Trapezius normal strength.  XII: Tongue midline without atrophy or fasciculations.   Motor exam  Muscle tone - no evidence of rigidity or resistance in all 4 extremities.  No atrophy or fasciculations  Muscle Strength: arms and legs, proximal and distal flexors and extensors are normal  No UE drift.  Reflexes  All present, normal, and symmetrical.  Plantars right: mute.  Plantars left: mute.  Absent ankle jerks.   Coordination  Finger to nose: Normal.  Heel to shin: Normal.   Sensory  Intact proprioception.  Decreased PP to hands and feet in stocking glove distribution. Decreased cold feet.  Decreased vibration to great toes B. Romberg present   Gait  Normal

## 2024-09-09 NOTE — HISTORY OF PRESENT ILLNESS
[FreeTextEntry1] : 9/9/24 Pacheco is here in follow-up.  He notes no significant improvement since I saw him last.  He reduced his pregabalin from 4 capsules to 3 because he is always afraid of being too sleepy in the morning.  He has to drive to the city at 4:15 in the morning.  He is also very hesitant to increase the duloxetine in the morning to 60 mg daily.  He is using a topical gel.  He feels that his strength and balance are stable.  His A1c has been increasing and is currently 6.8.  He was recently started on Januvia.  He is getting physical therapy for his low back pain.  10/2/23 Pacheco is here in follow-up.  He notes that the pain is increasing.  He notes that now in addition to the bottom of his feet he sees the top of his feet being involved.  He never increased the pregabalin to 200 mg at bedtime.  He is extremely afraid of sedating potential as he is to drive in the morning.  He works from home on Friday and Monday and has to commute Tuesday, Wednesday, and Thursday. No weakness or problems with balance.  Few weeks ago, he was gardening.  He bent down and had a pain in his low back which radiates into the right leg.  He has been seeing a chiropractor.  He does not notice any weakness.  His commute is about an hour.  After he has been sitting for prolonged period of time in the car, he notes increased numbness and pain in his right leg.  This actually resolves when he moves around.  3/27/23 Here in f/u. at night the painn can get worse and keeps him awake. able to tolerate pregabalin 150 mg qhs  He is afraid to add more medication due to possible side effects-  No complaints of leg weakness or pain.   9/23/22 5/23/22 This is a 71-year-old man who is being seen in neurologic consultation for evaluation of neuropathy.  Patient previously followed with my colleague Dr. Eduardo.  He reports significant burning pain which can keep him up at night.  He feels that his leg strength has not changed.  He notes no problems with balance.  He is currently taking gabapentin 900 mg at bedtime which does not control the pain fully.  I reviewed labs noted that his B6 was elevated at 72.  Patient states he does not take any B complex vitamins but does take a regular multivitamin daily. His most recent A1c is 6.1.   1/12/22- Milana Pina Pacheco Rincon is a 71 year old man with a history of CAD, T2DM, and sleep apnea presenting for a follow up appointment for neuropathic pain.  He is currently taking Gabapentin 800mg at 8pm and it lasts partially throughout the night but he will have burning at times. He has not tried Lidocaine cream in the past. He did not try any higher dose of Gabapentin. He denies weakness or balance difficulties or worsening dizziness. Mr. Rincon is awaiting his CPAP machine for ROSA.  Does not want to increase Gabapentin dose much further due to possible drowsiness side effect.   The remaining neurological review of systems is negative.  10/14/21 aPcheco Rincon is a 70 year old man with a history of CAD, T2DM, and sleep apnea presenting for a follow up appointment for neuropathic pain.  He is currently taking Gabapentin 600mg at 8pm and goes to bed at 10:30pm. Mr. Rincon reports the burning pain in his feet starts in the evening at 8pm. He endorses his pain is acceptable during the day except at times when he wants to walk during lunchtime. He has difficulty sleeping and burning pain during the night. He did not take the additional dose of Gabapentin 100mg due to fear of drowsiness for when he goes to work. Denies weakness or progression of symptoms.  Mr. Rincon endorses he had a general lightheadedness for a few minutes last week and plans on following up with his cardiologist because of the adjustment in his cardiac medications. Denies falls or balance difficulties. Denies the sensation of the room spinning, facial droop, changes in speech, or weakness on one side. Denies any dizziness currently.   He has a CPAP machine for obstructive sleep apnea and wakes up with a morning frontal headache which improves during the day. He has not seen his sleep specialist in a long time.  The remaining neurological review of systems is negative.  7/15/21  Pacheco Rincon is a 70 year old man with a history of CAD, T2DM, and sleep apnea presenting for a follow up appointment for pain in his feet and toes and EMG results.  He endorses he is currently taking Gabapentin 600mg nightly one hour before bedtime. He did not want to increase the dose. The medication provides relief of the pain at night. He continues to have minimal amount of pain during the day, he did not add the Gabapentin 100mg dose yet during the daytime or morning. He denies any weakness.  The remaining neurological review of systems is negative.   6/10/21 Pacheco Rincon is a 70 year old man with a history of CAD, T2DM, and sleep apnea presenting for a follow up appointment for pain in his feet and toes-worse at night.  He endorses he is currently on Gabapentin 500mg nightly. He is unsure if it makes him tired or not. He still has burning pain in the morning. He reports sensitivity with the sheets on his feet at night and would like to continue to Gabapentin. Denies any weakness.  The remaining neurological review of systems is negative.   4/29/21 Pacheco Rincon is a 70 year old man with a history of CAD, type 2 DM x 3 years, and sleep apnea presenting for a consultation for pain in his feet and toes - worst at night.   He endorses he has had burning pain in both feet for over one year primarily in the sole and toes. His pain fluctuates in intensity and his feet also feel cold at times.  Mr. Rincon denies any weakness. He saw a podiatrist and was told he had diabetic neuropathy. He is currently taking Gabapentin 300mg at night with partial relief. The pain used to wake him up more frequently at night but still disturbs his sleep. He endorses an upset stomach for 1-2 weeks and is not sure if it is medication related. He had increased the medication one week prior to his stomach discomfort and takes Omeprazole and Famotidine. He denies any balance changes, bowel or bladder difficulties, falls, or injuries.   He had arterial dopplers done and was told that it was not consistent with peripheral artery disease.   The remaining neurological review of systems is negative.

## 2024-09-09 NOTE — DATA REVIEWED
[de-identified] : 7/7/21 EMG legs- There is no electrophysiologic evidence of a LARGE FIBER polyneuropathy involving the legs. There is no electrophysiologic evidence of a lumbosacral radiculopathy involving the motor axons, bilaterally. The H reflex is absent, bilaterally. This may be normal for age or may be due to a chronic S1 radiculopathy, bilaterally. \par  3/16/21 TSH .184\par  3/15/21 HgA1C 6.6

## 2024-09-09 NOTE — HISTORY OF PRESENT ILLNESS
[FreeTextEntry1] : 9/9/24 Pacheco is here in follow-up.  He notes no significant improvement since I saw him last.  He reduced his pregabalin from 4 capsules to 3 because he is always afraid of being too sleepy in the morning.  He has to drive to the city at 4:15 in the morning.  He is also very hesitant to increase the duloxetine in the morning to 60 mg daily.  He is using a topical gel.  He feels that his strength and balance are stable.  His A1c has been increasing and is currently 6.8.  He was recently started on Januvia.  He is getting physical therapy for his low back pain.  10/2/23 Pacheco is here in follow-up.  He notes that the pain is increasing.  He notes that now in addition to the bottom of his feet he sees the top of his feet being involved.  He never increased the pregabalin to 200 mg at bedtime.  He is extremely afraid of sedating potential as he is to drive in the morning.  He works from home on Friday and Monday and has to commute Tuesday, Wednesday, and Thursday. No weakness or problems with balance.  Few weeks ago, he was gardening.  He bent down and had a pain in his low back which radiates into the right leg.  He has been seeing a chiropractor.  He does not notice any weakness.  His commute is about an hour.  After he has been sitting for prolonged period of time in the car, he notes increased numbness and pain in his right leg.  This actually resolves when he moves around.  3/27/23 Here in f/u. at night the painn can get worse and keeps him awake. able to tolerate pregabalin 150 mg qhs  He is afraid to add more medication due to possible side effects-  No complaints of leg weakness or pain.   9/23/22 5/23/22 This is a 71-year-old man who is being seen in neurologic consultation for evaluation of neuropathy.  Patient previously followed with my colleague Dr. Eduardo.  He reports significant burning pain which can keep him up at night.  He feels that his leg strength has not changed.  He notes no problems with balance.  He is currently taking gabapentin 900 mg at bedtime which does not control the pain fully.  I reviewed labs noted that his B6 was elevated at 72.  Patient states he does not take any B complex vitamins but does take a regular multivitamin daily. His most recent A1c is 6.1.   1/12/22- Milana Pina Pacheco Rincon is a 71 year old man with a history of CAD, T2DM, and sleep apnea presenting for a follow up appointment for neuropathic pain.  He is currently taking Gabapentin 800mg at 8pm and it lasts partially throughout the night but he will have burning at times. He has not tried Lidocaine cream in the past. He did not try any higher dose of Gabapentin. He denies weakness or balance difficulties or worsening dizziness. Mr. Rincon is awaiting his CPAP machine for [facility].  Does not want to increase Gabapentin dose much further due to possible drowsiness side effect.   The remaining neurological review of systems is negative.  10/14/21 Pacheco Rincon is a 70 year old man with a history of CAD, T2DM, and sleep apnea presenting for a follow up appointment for neuropathic pain.  He is currently taking Gabapentin 600mg at 8pm and goes to bed at 10:30pm. Mr. Rincon reports the burning pain in his feet starts in the evening at 8pm. He endorses his pain is acceptable during the day except at times when he wants to walk during lunchtime. He has difficulty sleeping and burning pain during the night. He did not take the additional dose of Gabapentin 100mg due to fear of drowsiness for when he goes to work. Denies weakness or progression of symptoms.  Mr. Rincon endorses he had a general lightheadedness for a few minutes last week and plans on following up with his cardiologist because of the adjustment in his cardiac medications. Denies falls or balance difficulties. Denies the sensation of the room spinning, facial droop, changes in speech, or weakness on one side. Denies any dizziness currently.   He has a CPAP machine for obstructive sleep apnea and wakes up with a morning frontal headache which improves during the day. He has not seen his sleep specialist in a long time.  The remaining neurological review of systems is negative.  7/15/21  Pacheco Rincon is a 70 year old man with a history of CAD, T2DM, and sleep apnea presenting for a follow up appointment for pain in his feet and toes and EMG results.  He endorses he is currently taking Gabapentin 600mg nightly one hour before bedtime. He did not want to increase the dose. The medication provides relief of the pain at night. He continues to have minimal amount of pain during the day, he did not add the Gabapentin 100mg dose yet during the daytime or morning. He denies any weakness.  The remaining neurological review of systems is negative.   6/10/21 Pacheco Rincon is a 70 year old man with a history of CAD, T2DM, and sleep apnea presenting for a follow up appointment for pain in his feet and toes-worse at night.  He endorses he is currently on Gabapentin 500mg nightly. He is unsure if it makes him tired or not. He still has burning pain in the morning. He reports sensitivity with the sheets on his feet at night and would like to continue to Gabapentin. Denies any weakness.  The remaining neurological review of systems is negative.   4/29/21 Pacheco Rincon is a 70 year old man with a history of CAD, type 2 DM x 3 years, and sleep apnea presenting for a consultation for pain in his feet and toes - worst at night.   He endorses he has had burning pain in both feet for over one year primarily in the sole and toes. His pain fluctuates in intensity and his feet also feel cold at times.  Mr. Rincon denies any weakness. He saw a podiatrist and was told he had diabetic neuropathy. He is currently taking Gabapentin 300mg at night with partial relief. The pain used to wake him up more frequently at night but still disturbs his sleep. He endorses an upset stomach for 1-2 weeks and is not sure if it is medication related. He had increased the medication one week prior to his stomach discomfort and takes Omeprazole and Famotidine. He denies any balance changes, bowel or bladder difficulties, falls, or injuries.   He had arterial dopplers done and was told that it was not consistent with peripheral artery disease.   The remaining neurological review of systems is negative.

## 2024-09-09 NOTE — DATA REVIEWED
[de-identified] : 7/7/21 EMG legs- There is no electrophysiologic evidence of a LARGE FIBER polyneuropathy involving the legs. There is no electrophysiologic evidence of a lumbosacral radiculopathy involving the motor axons, bilaterally. The H reflex is absent, bilaterally. This may be normal for age or may be due to a chronic S1 radiculopathy, bilaterally. \par  3/16/21 TSH .184\par  3/15/21 HgA1C 6.6

## 2024-09-09 NOTE — CONSULT LETTER
[Dear  ___] : Dear  [unfilled], [Please see my note below.] : Please see my note below. [Courtesy Letter:] : I had the pleasure of seeing your patient, [unfilled], in my office today. [FreeTextEntry3] : Sincerely,\par  \par  Mary Bruno M.D.\par

## 2024-09-30 ENCOUNTER — APPOINTMENT (OUTPATIENT)
Dept: CARDIOLOGY | Facility: CLINIC | Age: 74
End: 2024-09-30

## 2024-10-28 ENCOUNTER — APPOINTMENT (OUTPATIENT)
Dept: CARDIOLOGY | Facility: CLINIC | Age: 74
End: 2024-10-28

## 2024-12-18 ENCOUNTER — APPOINTMENT (OUTPATIENT)
Dept: CARDIOLOGY | Facility: CLINIC | Age: 74
End: 2024-12-18

## 2024-12-23 ENCOUNTER — RX RENEWAL (OUTPATIENT)
Age: 74
End: 2024-12-23

## 2024-12-24 ENCOUNTER — TRANSCRIPTION ENCOUNTER (OUTPATIENT)
Age: 74
End: 2024-12-24

## 2025-02-24 ENCOUNTER — RX RENEWAL (OUTPATIENT)
Age: 75
End: 2025-02-24

## 2025-02-26 ENCOUNTER — RX RENEWAL (OUTPATIENT)
Age: 75
End: 2025-02-26

## 2025-03-17 ENCOUNTER — APPOINTMENT (OUTPATIENT)
Dept: NEUROLOGY | Facility: CLINIC | Age: 75
End: 2025-03-17
Payer: COMMERCIAL

## 2025-03-17 ENCOUNTER — RESULT REVIEW (OUTPATIENT)
Age: 75
End: 2025-03-17

## 2025-03-17 VITALS
OXYGEN SATURATION: 95 % | WEIGHT: 200 LBS | DIASTOLIC BLOOD PRESSURE: 74 MMHG | HEART RATE: 64 BPM | BODY MASS INDEX: 31.39 KG/M2 | HEIGHT: 67 IN | SYSTOLIC BLOOD PRESSURE: 126 MMHG

## 2025-03-17 DIAGNOSIS — M79.89 OTHER SPECIFIED SOFT TISSUE DISORDERS: ICD-10-CM

## 2025-03-17 DIAGNOSIS — G62.9 POLYNEUROPATHY, UNSPECIFIED: ICD-10-CM

## 2025-03-17 PROCEDURE — 99214 OFFICE O/P EST MOD 30 MIN: CPT

## 2025-03-21 ENCOUNTER — NON-APPOINTMENT (OUTPATIENT)
Age: 75
End: 2025-03-21

## 2025-03-24 LAB
CRP SERPL-MCNC: <3 MG/L
URATE SERPL-MCNC: 5.3 MG/DL

## 2025-03-25 ENCOUNTER — NON-APPOINTMENT (OUTPATIENT)
Age: 75
End: 2025-03-25

## 2025-05-08 ENCOUNTER — RX RENEWAL (OUTPATIENT)
Age: 75
End: 2025-05-08

## 2025-05-20 ENCOUNTER — RX RENEWAL (OUTPATIENT)
Age: 75
End: 2025-05-20

## 2025-06-03 ENCOUNTER — RX RENEWAL (OUTPATIENT)
Age: 75
End: 2025-06-03

## 2025-06-25 ENCOUNTER — NON-APPOINTMENT (OUTPATIENT)
Age: 75
End: 2025-06-25

## 2025-06-27 ENCOUNTER — APPOINTMENT (OUTPATIENT)
Dept: CARDIOLOGY | Facility: CLINIC | Age: 75
End: 2025-06-27
Payer: COMMERCIAL

## 2025-06-27 VITALS
SYSTOLIC BLOOD PRESSURE: 126 MMHG | WEIGHT: 202 LBS | DIASTOLIC BLOOD PRESSURE: 68 MMHG | BODY MASS INDEX: 31.71 KG/M2 | HEIGHT: 67 IN

## 2025-06-27 PROCEDURE — G2211 COMPLEX E/M VISIT ADD ON: CPT | Mod: NC

## 2025-06-27 PROCEDURE — 99215 OFFICE O/P EST HI 40 MIN: CPT

## 2025-06-27 PROCEDURE — 93000 ELECTROCARDIOGRAM COMPLETE: CPT

## 2025-07-25 ENCOUNTER — APPOINTMENT (OUTPATIENT)
Dept: CARDIOLOGY | Facility: CLINIC | Age: 75
End: 2025-07-25
Payer: COMMERCIAL

## 2025-07-25 PROCEDURE — 93351 STRESS TTE COMPLETE: CPT

## 2025-07-25 PROCEDURE — ZZZZZ: CPT | Mod: NC

## 2025-07-25 PROCEDURE — 93306 TTE W/DOPPLER COMPLETE: CPT | Mod: 59

## 2025-08-04 ENCOUNTER — RX RENEWAL (OUTPATIENT)
Age: 75
End: 2025-08-04